# Patient Record
Sex: FEMALE | Race: WHITE | NOT HISPANIC OR LATINO
[De-identification: names, ages, dates, MRNs, and addresses within clinical notes are randomized per-mention and may not be internally consistent; named-entity substitution may affect disease eponyms.]

---

## 2017-08-17 ENCOUNTER — APPOINTMENT (OUTPATIENT)
Dept: CARDIOLOGY | Facility: CLINIC | Age: 72
End: 2017-08-17
Payer: MEDICARE

## 2017-08-17 PROCEDURE — 93306 TTE W/DOPPLER COMPLETE: CPT

## 2017-09-26 ENCOUNTER — OTHER (OUTPATIENT)
Age: 72
End: 2017-09-26

## 2017-11-22 ENCOUNTER — EMERGENCY (EMERGENCY)
Facility: HOSPITAL | Age: 72
LOS: 1 days | Discharge: HOME | End: 2017-11-22
Attending: EMERGENCY MEDICINE
Payer: COMMERCIAL

## 2017-11-22 VITALS
OXYGEN SATURATION: 99 % | TEMPERATURE: 98 F | DIASTOLIC BLOOD PRESSURE: 82 MMHG | RESPIRATION RATE: 18 BRPM | HEART RATE: 79 BPM | SYSTOLIC BLOOD PRESSURE: 172 MMHG | WEIGHT: 119.93 LBS | HEIGHT: 65 IN

## 2017-11-22 LAB
ALBUMIN SERPL ELPH-MCNC: 4.6 G/DL — SIGNIFICANT CHANGE UP (ref 3.3–5)
ALP SERPL-CCNC: 61 U/L — SIGNIFICANT CHANGE UP (ref 40–120)
ALT FLD-CCNC: 27 U/L RC — SIGNIFICANT CHANGE UP (ref 10–45)
ANION GAP SERPL CALC-SCNC: 15 MMOL/L — SIGNIFICANT CHANGE UP (ref 5–17)
AST SERPL-CCNC: 40 U/L — SIGNIFICANT CHANGE UP (ref 10–40)
BASOPHILS # BLD AUTO: 0 K/UL — SIGNIFICANT CHANGE UP (ref 0–0.2)
BASOPHILS NFR BLD AUTO: 0.3 % — SIGNIFICANT CHANGE UP (ref 0–2)
BILIRUB SERPL-MCNC: 0.3 MG/DL — SIGNIFICANT CHANGE UP (ref 0.2–1.2)
BUN SERPL-MCNC: 12 MG/DL — SIGNIFICANT CHANGE UP (ref 7–23)
CALCIUM SERPL-MCNC: 9.4 MG/DL — SIGNIFICANT CHANGE UP (ref 8.4–10.5)
CHLORIDE SERPL-SCNC: 93 MMOL/L — LOW (ref 96–108)
CO2 SERPL-SCNC: 27 MMOL/L — SIGNIFICANT CHANGE UP (ref 22–31)
CREAT SERPL-MCNC: 0.61 MG/DL — SIGNIFICANT CHANGE UP (ref 0.5–1.3)
EOSINOPHIL # BLD AUTO: 0.1 K/UL — SIGNIFICANT CHANGE UP (ref 0–0.5)
EOSINOPHIL NFR BLD AUTO: 1 % — SIGNIFICANT CHANGE UP (ref 0–6)
GLUCOSE SERPL-MCNC: 115 MG/DL — HIGH (ref 70–99)
HCT VFR BLD CALC: 40.4 % — SIGNIFICANT CHANGE UP (ref 34.5–45)
HGB BLD-MCNC: 13.8 G/DL — SIGNIFICANT CHANGE UP (ref 11.5–15.5)
LIDOCAIN IGE QN: 49 U/L — SIGNIFICANT CHANGE UP (ref 7–60)
LYMPHOCYTES # BLD AUTO: 1.4 K/UL — SIGNIFICANT CHANGE UP (ref 1–3.3)
LYMPHOCYTES # BLD AUTO: 26.9 % — SIGNIFICANT CHANGE UP (ref 13–44)
MCHC RBC-ENTMCNC: 32.8 PG — SIGNIFICANT CHANGE UP (ref 27–34)
MCHC RBC-ENTMCNC: 34 GM/DL — SIGNIFICANT CHANGE UP (ref 32–36)
MCV RBC AUTO: 96.4 FL — SIGNIFICANT CHANGE UP (ref 80–100)
MONOCYTES # BLD AUTO: 0.8 K/UL — SIGNIFICANT CHANGE UP (ref 0–0.9)
MONOCYTES NFR BLD AUTO: 16 % — HIGH (ref 2–14)
NEUTROPHILS # BLD AUTO: 2.8 K/UL — SIGNIFICANT CHANGE UP (ref 1.8–7.4)
NEUTROPHILS NFR BLD AUTO: 55.7 % — SIGNIFICANT CHANGE UP (ref 43–77)
PLATELET # BLD AUTO: 287 K/UL — SIGNIFICANT CHANGE UP (ref 150–400)
POTASSIUM SERPL-MCNC: 4 MMOL/L — SIGNIFICANT CHANGE UP (ref 3.5–5.3)
POTASSIUM SERPL-SCNC: 4 MMOL/L — SIGNIFICANT CHANGE UP (ref 3.5–5.3)
PROT SERPL-MCNC: 7.9 G/DL — SIGNIFICANT CHANGE UP (ref 6–8.3)
RBC # BLD: 4.19 M/UL — SIGNIFICANT CHANGE UP (ref 3.8–5.2)
RBC # FLD: 11.6 % — SIGNIFICANT CHANGE UP (ref 10.3–14.5)
SODIUM SERPL-SCNC: 135 MMOL/L — SIGNIFICANT CHANGE UP (ref 135–145)
WBC # BLD: 5 K/UL — SIGNIFICANT CHANGE UP (ref 3.8–10.5)
WBC # FLD AUTO: 5 K/UL — SIGNIFICANT CHANGE UP (ref 3.8–10.5)

## 2017-11-22 PROCEDURE — 99284 EMERGENCY DEPT VISIT MOD MDM: CPT | Mod: 25

## 2017-11-22 PROCEDURE — 83690 ASSAY OF LIPASE: CPT

## 2017-11-22 PROCEDURE — 85027 COMPLETE CBC AUTOMATED: CPT

## 2017-11-22 PROCEDURE — 96375 TX/PRO/DX INJ NEW DRUG ADDON: CPT

## 2017-11-22 PROCEDURE — 93010 ELECTROCARDIOGRAM REPORT: CPT

## 2017-11-22 PROCEDURE — 80053 COMPREHEN METABOLIC PANEL: CPT

## 2017-11-22 PROCEDURE — 93005 ELECTROCARDIOGRAM TRACING: CPT

## 2017-11-22 PROCEDURE — 96374 THER/PROPH/DIAG INJ IV PUSH: CPT

## 2017-11-22 RX ORDER — SODIUM CHLORIDE 9 MG/ML
1000 INJECTION INTRAMUSCULAR; INTRAVENOUS; SUBCUTANEOUS ONCE
Qty: 0 | Refills: 0 | Status: COMPLETED | OUTPATIENT
Start: 2017-11-22 | End: 2017-11-22

## 2017-11-22 RX ORDER — ONDANSETRON 8 MG/1
4 TABLET, FILM COATED ORAL ONCE
Qty: 0 | Refills: 0 | Status: COMPLETED | OUTPATIENT
Start: 2017-11-22 | End: 2017-11-22

## 2017-11-22 RX ORDER — FAMOTIDINE 10 MG/ML
20 INJECTION INTRAVENOUS ONCE
Qty: 0 | Refills: 0 | Status: COMPLETED | OUTPATIENT
Start: 2017-11-22 | End: 2017-11-22

## 2017-11-22 RX ADMIN — ONDANSETRON 4 MILLIGRAM(S): 8 TABLET, FILM COATED ORAL at 15:07

## 2017-11-22 RX ADMIN — FAMOTIDINE 20 MILLIGRAM(S): 10 INJECTION INTRAVENOUS at 15:07

## 2017-11-22 RX ADMIN — SODIUM CHLORIDE 1000 MILLILITER(S): 9 INJECTION INTRAMUSCULAR; INTRAVENOUS; SUBCUTANEOUS at 15:06

## 2017-11-22 NOTE — ED PROVIDER NOTE - PROGRESS NOTE DETAILS
Dr. Seun Foley PGY1: nausea improved s/p pepcid/nausea will po challenge Dr. Seun Foley PGY1: pt able to tolerate 1/2 cup water w/ minimal nausea still pending labs Dr. Seun Foley PGY1: labs wnl ok for dc

## 2017-11-22 NOTE — ED ADULT NURSE NOTE - OBJECTIVE STATEMENT
c/o nausea and vomiting x 3-4 days since starting po antibiotics for strep throat. At this time denies any fever, cough or sore throat, reports she had recent strep cx of throat which was negative. States she is unable to tolerate fluids or food po, vomiting about 30 minutes after eating or drinking. Also reports loose stools. Denies any abdominal pain, abdomen is nontender and nondistended. Skin warm dry and intact.

## 2017-11-22 NOTE — ED PROVIDER NOTE - MEDICAL DECISION MAKING DETAILS
73 yo F w/ nausea x 4 days no pain, recent hx strept throat took erythromycin appears dry but otherwise well 71 yo F w/ nausea x 4 days no pain, recent hx strept throat took erythromycin appears dry but otherwise well ECG is normal ,probably drug related gastritis ,will obtain blood work ,lipase to ro pancreatitis and on reevaluation: ZR

## 2017-11-22 NOTE — ED PROVIDER NOTE - OBJECTIVE STATEMENT
71 yo F w/ pmh htn, hld, hypothyroidism p/w nausea and vomit x 4 days. Pt has been unable to keep fluids down last few days. Pt was treated for strept throat (sore throat, from grandkids) w/ erythromycin on Sunday. Pt saw Dr. Dial yesterday and had cxr/labs drawn, said low Na. Pt reports pressure HA assc w/ vomiting; one episode non-b diarrhea today. Pt denies cp/abd p, fever/chills, sob, lightheadedness. Pt has been trying to take otc meds for n/v but didn't work. Last vomit 8:30am.     PCP: Dr. Roger Dial 71 yo F w/ pmh htn, hld, hypothyroidism p/w nausea and vomit x 4 days. Pt has been unable to keep fluids down last few days. Pt was treated for strept throat (sore throat, from grandkids) w/ erythromycin on Sunday. Pt saw Dr. Dial yesterday and had cxr/labs drawn, said low Na. Pt reports pressure HA assc w/ vomiting; one episode non-b diarrhea today. Pt denies cp/abd p, fever/chills, sob, lightheadedness, lethargy. Pt has been trying to take otc meds for n/v but didn't work. Last vomit 8:30am.     PCP: Dr. Roger Dial

## 2017-11-22 NOTE — ED PROVIDER NOTE - ENMT, MLM
Airway patent, Nasal mucosa clear. Mouth w/ dry mucosal membranes. Throat has no vesicles, no oropharyngeal exudates and uvula is midline.

## 2018-01-02 ENCOUNTER — APPOINTMENT (OUTPATIENT)
Dept: OBGYN | Facility: CLINIC | Age: 73
End: 2018-01-02

## 2018-07-07 ENCOUNTER — EMERGENCY (EMERGENCY)
Facility: HOSPITAL | Age: 73
LOS: 1 days | Discharge: HOME | End: 2018-07-07
Attending: EMERGENCY MEDICINE
Payer: COMMERCIAL

## 2018-07-07 VITALS
DIASTOLIC BLOOD PRESSURE: 75 MMHG | OXYGEN SATURATION: 95 % | RESPIRATION RATE: 19 BRPM | HEART RATE: 85 BPM | TEMPERATURE: 98 F | SYSTOLIC BLOOD PRESSURE: 145 MMHG

## 2018-07-07 VITALS
SYSTOLIC BLOOD PRESSURE: 157 MMHG | OXYGEN SATURATION: 97 % | RESPIRATION RATE: 18 BRPM | TEMPERATURE: 99 F | HEART RATE: 89 BPM | WEIGHT: 119.93 LBS | HEIGHT: 65 IN | DIASTOLIC BLOOD PRESSURE: 68 MMHG

## 2018-07-07 PROCEDURE — 90715 TDAP VACCINE 7 YRS/> IM: CPT

## 2018-07-07 PROCEDURE — 70486 CT MAXILLOFACIAL W/O DYE: CPT

## 2018-07-07 PROCEDURE — 99284 EMERGENCY DEPT VISIT MOD MDM: CPT | Mod: 25

## 2018-07-07 PROCEDURE — 70450 CT HEAD/BRAIN W/O DYE: CPT | Mod: 26

## 2018-07-07 PROCEDURE — 99285 EMERGENCY DEPT VISIT HI MDM: CPT | Mod: 25

## 2018-07-07 PROCEDURE — 76377 3D RENDER W/INTRP POSTPROCES: CPT | Mod: 26

## 2018-07-07 PROCEDURE — 90471 IMMUNIZATION ADMIN: CPT

## 2018-07-07 PROCEDURE — 25605 CLTX DST RDL FX/EPHYS SEP W/: CPT | Mod: LT

## 2018-07-07 PROCEDURE — 70486 CT MAXILLOFACIAL W/O DYE: CPT | Mod: 26

## 2018-07-07 PROCEDURE — 73110 X-RAY EXAM OF WRIST: CPT

## 2018-07-07 PROCEDURE — 76377 3D RENDER W/INTRP POSTPROCES: CPT

## 2018-07-07 PROCEDURE — 70450 CT HEAD/BRAIN W/O DYE: CPT

## 2018-07-07 PROCEDURE — 73110 X-RAY EXAM OF WRIST: CPT | Mod: 26,LT

## 2018-07-07 RX ORDER — ACETAMINOPHEN 500 MG
975 TABLET ORAL ONCE
Qty: 0 | Refills: 0 | Status: COMPLETED | OUTPATIENT
Start: 2018-07-07 | End: 2018-07-07

## 2018-07-07 RX ORDER — TETANUS TOXOID, REDUCED DIPHTHERIA TOXOID AND ACELLULAR PERTUSSIS VACCINE, ADSORBED 5; 2.5; 8; 8; 2.5 [IU]/.5ML; [IU]/.5ML; UG/.5ML; UG/.5ML; UG/.5ML
0.5 SUSPENSION INTRAMUSCULAR ONCE
Qty: 0 | Refills: 0 | Status: COMPLETED | OUTPATIENT
Start: 2018-07-07 | End: 2018-07-07

## 2018-07-07 RX ADMIN — Medication 975 MILLIGRAM(S): at 15:18

## 2018-07-07 RX ADMIN — TETANUS TOXOID, REDUCED DIPHTHERIA TOXOID AND ACELLULAR PERTUSSIS VACCINE, ADSORBED 0.5 MILLILITER(S): 5; 2.5; 8; 8; 2.5 SUSPENSION INTRAMUSCULAR at 15:53

## 2018-07-07 RX ADMIN — Medication 975 MILLIGRAM(S): at 16:00

## 2018-07-07 NOTE — ED PROVIDER NOTE - PLAN OF CARE
Take all medications as directed.  For pain take Ibuprofen (Motrin, Advil) 400mg-600mg every 6-8 hours or Acetaminophen (Tylenol) 250mg-650mg every 6-8 hours.  Follow up with your primary physician in 3-4 days. If needed call [phone] to find a primary care physician or call  [phone] to schedule an appointment with the general medicine clinic.   You were given copies of all labs and imaging results from this ER visit, please take them to your appointment.  Return to the ER for worsening symptoms or any other concerns.

## 2018-07-07 NOTE — ED PROVIDER NOTE - MEDICAL DECISION MAKING DETAILS
73y female with PMH of HTN, Hypothyroid, and HLD presenting with L. wrist pain, nose pain with an abrasion and ecchymosis after a mechanical fall.  Patient denies LOC and was able to immediately walk home after the fall.  No focal neurological deficits, not on blood thinners.  Get x-ray of left wrist and elbow. 73y female with PMH of HTN, Hypothyroid, and HLD presenting with L. wrist pain, nose pain with an abrasion and ecchymosis after a mechanical fall.  Patient denies LOC and was able to immediately walk home after the fall.  No focal neurological deficits, not on blood thinners.  Maxillofacial and head CT.  Get x-ray of left wrist and elbow.  Tylenol for pain.  D/C home if CT negative see mdm / attending note

## 2018-07-07 NOTE — ED PROVIDER NOTE - OBJECTIVE STATEMENT
73y female w/ PMH of HTN, hypothyroid and HLD presenting with left wrist pain, nose pain and left knee pain after a fall.  She fell while out for a walk around 11:30am, tripping over the sidewalk landing on her left wrist and hitting her face and knee.  She was able to get up and walk home immediately after the fall.  Denies LOC, chest pain, dizziness, blurry vision, headache before the fall and after the fall  Denies taking aspirin or other blood thinners.  She did not take medications for pain before coming to the hospital.  Does not remember last tetanus shot.

## 2018-07-07 NOTE — ED PROVIDER NOTE - MUSCULOSKELETAL MINIMAL EXAM
Left anterior wrist swelling and eccchymosis, decreased ROM of left wrist, normal ROM right, Neurovascular intake - no decrease in sensation of hands bilaterally, capillary refill 1sec..  Abrasion to the left knee, no swelling or tenderness.  trength in upper and lower extremities 5/5 bilaterally Left anterior wrist swelling and eccchymosis, decreased ROM of left wrist, normal ROM right, Neurovascular intake - no decrease in sensation of hands bilaterally, capillary refill 1sec..  Abrasion to the left knee, no swelling or tenderness.  Strength in upper and lower extremities 5/5 bilaterally

## 2018-07-07 NOTE — ED PROVIDER NOTE - SECONDARY DIAGNOSIS.
Other closed extra-articular fracture of distal end of left radius, initial encounter Facial abrasion, initial encounter

## 2018-07-07 NOTE — ED PROVIDER NOTE - PROGRESS NOTE DETAILS
X-Ray shows fracture of left distal radius X-Ray shows fracture of left distal radius.  Will apply splint. Attending MD Brito.  Pt signed out to me in stable condition pending CT imaging.  Pt is s/p mechanical fall.  L impacted distal radius fx. Pending CT head/max-face 2/2 L periorbital ecchymosis.  NO midline spinal TTP/stepoffs.  Once CT’s result plan to d/c.  Follow-up with ortho in 1 wk. pts repeat wrist xray with improvement, CT scan with nasal fracture, will give follow up information for both orthopedics and ENT Attending MD Brito.  Pt informed fo improved alignment of L distal radius fx, non-actionable head CT and advised of minimally depressed nasal bone fx.  Pt reexamined prior to discharge and remains nvsc intact distal to site of radius fx with splint in place.  Pt counseled to follow-up in same day ortho clinic on  monday for reassessment and poss hard casting.  Given information for follow-up with ENT/plastic surgery for reassessment of facial fx.  Counseled re: nasal precautions (sneeze through open mouth, avoid blowing nose or FB in nose, no drinking with straw).  Reassessed prior to discharge and has no evidence of septal hematoma.  No epistaxis currently. Pt and  verbalize understanding of need to follow-up with ortho for wrist and ENT or plastics for facial bone fx.  Return to ED for any acutely new/worsening sxs including acutely worsened pain of face/wrist, development of epistaxis or numbness/weakness of L hand.  Stable for discharge.

## 2018-07-07 NOTE — ED PROVIDER NOTE - ATTENDING CONTRIBUTION TO CARE
------------ATTENDING NOTE------------  LHD pt w/     ED Sign Out 1800 (J [employer]) pending CT reads, reassessments, repeat VS, likely d/c w/ close outpt fu if wnl.  - Javier Patel MD   ----------------------------------------------- ------------ATTENDING NOTE------------  LHD pt w/  c/o mechanical trip/fall walking this AM, landed on face and L wrist, no loss of consciousness or AMS, c/o mild gradual onset dull frontal headache, abrasions to face and L knee, and bruising and constant mild dull ache in L wrist worse w/ movement, ambulatory at scene, washed wounds at home, came to ED at 's request, nml neuro exam, no dental/intraoral injury, HD stable, clear CTL spine, benign stable chest and abdomen, ambulatory in ED w/o pain/distress, L wrist splinted, ED Sign Out 1800 (J [employer]) pending CT reads, reassessments, repeat VS, likely d/c w/ close outpt fu if wnl.  - Javier Patel MD   -----------------------------------------------

## 2018-07-07 NOTE — ED ADULT NURSE NOTE - OBJECTIVE STATEMENT
72 y/o F A&Ox3 presents to the ED s/p mechanical fall this morning around 1100. Pt. reports she was taking her daily walk and tripped and fell on the concrete. Denies LOC and pt. states she is not taking any blood thinners. Pt. reports she landed on bilateral hands and front of face. Pt. has bilateral aristides orbital ecchymosis and swelling to nose. Pt. has abrasion to bridge of nose and in center of forehead.  Pt. reports bilateral nostrils were bleeding after fall. Bleeding controlled on arrival to ED. Pt. has ecchymosis and swelling to L hand and wrist. Pt. is able to move all L fingers and wrist with difficulty due to pain. Pt. denies numbness/tingling sensation to fingers and has a strong peripheral pulse. Pt. also has abrasions to L knee. Pt. has pos. sensory perception and pos. strength to all extremities. Pt. reports she was able to ambulate home after fall. PERRL - 2 mm bilaterally. Denies taking any pain medications. Safety and comfort provided. 72 y/o F A&Ox3 presents to the ED s/p mechanical fall this morning around 1100. Pt. reports she was taking her daily walk and tripped and fell on the concrete. Denies LOC and pt. states she is not taking any blood thinners. Pt. reports she landed on bilateral hands and front of face. Pt. has bilateral aristides orbital ecchymosis and swelling to nose. Pt. has abrasion to bridge of nose and in center of forehead.  Pt. reports bilateral nostrils were bleeding after fall. Bleeding controlled on arrival to ED. Pt. has ecchymosis and swelling to L hand and wrist. Pt. is able to move all L fingers and wrist with difficulty due to pain. Pt. denies numbness/tingling sensation to fingers and has a strong peripheral pulse. Pt. also has abrasions to L knee. Pt. has pos. sensory perception and pos. strength to all extremities. Pt. reports she was able to ambulate home after fall. PERRL - 2 mm bilaterally. Denies taking any pain medications. Tetanus vaccine not up to date. To be administered as per MD order. Safety and comfort provided.

## 2018-07-07 NOTE — ED ADULT TRIAGE NOTE - CHIEF COMPLAINT QUOTE
s/p fall while walking outside. Pt stated tripped and fall denies any LOC. Denies any syncope. Pt remember what happened before, during and after the fall. Pt fell on her face. +abrasions on the face. +swelling left hand. Pt is not on any asprin or any blood thinner

## 2018-07-07 NOTE — ED PROVIDER NOTE - CARE PLAN
Principal Discharge DX:	Closed head injury without loss of consciousness, initial encounter  Secondary Diagnosis:	Other closed extra-articular fracture of distal end of left radius, initial encounter  Secondary Diagnosis:	Facial abrasion, initial encounter Principal Discharge DX:	Closed head injury without loss of consciousness, initial encounter  Assessment and plan of treatment:	Take all medications as directed.  For pain take Ibuprofen (Motrin, Advil) 400mg-600mg every 6-8 hours or Acetaminophen (Tylenol) 250mg-650mg every 6-8 hours.  Follow up with your primary physician in 3-4 days. If needed call [phone] to find a primary care physician or call  [phone] to schedule an appointment with the general medicine clinic.   You were given copies of all labs and imaging results from this ER visit, please take them to your appointment.  Return to the ER for worsening symptoms or any other concerns.  Secondary Diagnosis:	Other closed extra-articular fracture of distal end of left radius, initial encounter  Secondary Diagnosis:	Facial abrasion, initial encounter

## 2018-07-08 NOTE — ED PROCEDURE NOTE - PROCEDURE ADDITIONAL DETAILS
Closed Distal Impacted Left Radius Fracture (soft compartments, nvi w/ bcr and FROM distally)    - anesthesia lidocaine 1% 5 ml sterile hematoma block    - fracture reduction w/ hanging weight / counter traction    - short arm sugar tong splint (stocking, webril, plaster, ace wrap)    - improved alignment plain radiographs    - nvi w/ bcr distally, comfortable in sling    Javier Patel MD

## 2018-07-10 ENCOUNTER — APPOINTMENT (OUTPATIENT)
Dept: ORTHOPEDIC SURGERY | Facility: CLINIC | Age: 73
End: 2018-07-10
Payer: MEDICARE

## 2018-07-10 VITALS
BODY MASS INDEX: 19.99 KG/M2 | HEIGHT: 65 IN | DIASTOLIC BLOOD PRESSURE: 80 MMHG | WEIGHT: 120 LBS | SYSTOLIC BLOOD PRESSURE: 158 MMHG | HEART RATE: 88 BPM

## 2018-07-10 PROCEDURE — 99203 OFFICE O/P NEW LOW 30 MIN: CPT

## 2018-07-10 PROCEDURE — 73110 X-RAY EXAM OF WRIST: CPT | Mod: LT

## 2018-07-16 PROBLEM — I10 ESSENTIAL (PRIMARY) HYPERTENSION: Chronic | Status: ACTIVE | Noted: 2017-11-22

## 2018-07-16 PROBLEM — E03.9 HYPOTHYROIDISM, UNSPECIFIED: Chronic | Status: ACTIVE | Noted: 2017-11-22

## 2018-07-16 PROBLEM — E78.5 HYPERLIPIDEMIA, UNSPECIFIED: Chronic | Status: ACTIVE | Noted: 2017-11-22

## 2018-07-17 ENCOUNTER — FORM ENCOUNTER (OUTPATIENT)
Age: 73
End: 2018-07-17

## 2018-07-18 ENCOUNTER — OUTPATIENT (OUTPATIENT)
Dept: OUTPATIENT SERVICES | Facility: HOSPITAL | Age: 73
LOS: 1 days | End: 2018-07-18
Payer: COMMERCIAL

## 2018-07-18 ENCOUNTER — APPOINTMENT (OUTPATIENT)
Dept: MRI IMAGING | Facility: CLINIC | Age: 73
End: 2018-07-18
Payer: MEDICARE

## 2018-07-18 DIAGNOSIS — S52.532A COLLES' FRACTURE OF LEFT RADIUS, INITIAL ENCOUNTER FOR CLOSED FRACTURE: ICD-10-CM

## 2018-07-18 PROCEDURE — 73221 MRI JOINT UPR EXTREM W/O DYE: CPT | Mod: 26,LT

## 2018-07-18 PROCEDURE — 73221 MRI JOINT UPR EXTREM W/O DYE: CPT

## 2018-07-20 ENCOUNTER — RECORD ABSTRACTING (OUTPATIENT)
Age: 73
End: 2018-07-20

## 2018-07-31 ENCOUNTER — APPOINTMENT (OUTPATIENT)
Dept: ORTHOPEDIC SURGERY | Facility: CLINIC | Age: 73
End: 2018-07-31
Payer: MEDICARE

## 2018-07-31 VITALS
HEIGHT: 65 IN | HEART RATE: 83 BPM | SYSTOLIC BLOOD PRESSURE: 147 MMHG | DIASTOLIC BLOOD PRESSURE: 61 MMHG | BODY MASS INDEX: 19.99 KG/M2 | WEIGHT: 120 LBS

## 2018-07-31 PROCEDURE — 99213 OFFICE O/P EST LOW 20 MIN: CPT | Mod: 25

## 2018-07-31 PROCEDURE — 29075 APPL CST ELBW FNGR SHORT ARM: CPT | Mod: LT

## 2018-07-31 PROCEDURE — 73110 X-RAY EXAM OF WRIST: CPT | Mod: LT

## 2018-08-21 ENCOUNTER — APPOINTMENT (OUTPATIENT)
Dept: ORTHOPEDIC SURGERY | Facility: CLINIC | Age: 73
End: 2018-08-21
Payer: MEDICARE

## 2018-08-21 PROCEDURE — 99213 OFFICE O/P EST LOW 20 MIN: CPT

## 2018-08-21 PROCEDURE — 73110 X-RAY EXAM OF WRIST: CPT | Mod: LT

## 2018-08-23 ENCOUNTER — APPOINTMENT (OUTPATIENT)
Dept: PULMONOLOGY | Facility: CLINIC | Age: 73
End: 2018-08-23
Payer: MEDICARE

## 2018-08-23 VITALS
WEIGHT: 120 LBS | BODY MASS INDEX: 19.99 KG/M2 | OXYGEN SATURATION: 97 % | TEMPERATURE: 98.4 F | RESPIRATION RATE: 16 BRPM | HEIGHT: 65 IN | SYSTOLIC BLOOD PRESSURE: 150 MMHG | DIASTOLIC BLOOD PRESSURE: 68 MMHG | HEART RATE: 77 BPM

## 2018-08-23 DIAGNOSIS — B02.9 ZOSTER W/OUT COMPLICATIONS: ICD-10-CM

## 2018-08-23 DIAGNOSIS — B02.23 POSTHERPETIC POLYNEUROPATHY: ICD-10-CM

## 2018-08-23 PROCEDURE — 99213 OFFICE O/P EST LOW 20 MIN: CPT

## 2018-09-28 ENCOUNTER — NON-APPOINTMENT (OUTPATIENT)
Age: 73
End: 2018-09-28

## 2018-09-28 ENCOUNTER — APPOINTMENT (OUTPATIENT)
Dept: PULMONOLOGY | Facility: CLINIC | Age: 73
End: 2018-09-28
Payer: MEDICARE

## 2018-09-28 VITALS
HEART RATE: 89 BPM | OXYGEN SATURATION: 97 % | WEIGHT: 120 LBS | BODY MASS INDEX: 19.99 KG/M2 | HEIGHT: 65 IN | DIASTOLIC BLOOD PRESSURE: 75 MMHG | SYSTOLIC BLOOD PRESSURE: 133 MMHG

## 2018-09-28 LAB
ALBUMIN: 10
BILIRUB UR QL STRIP: NEGATIVE
CLARITY UR: CLEAR
COLLECTION METHOD: NORMAL
CREATININE: 50
GLUCOSE UR-MCNC: NEGATIVE
HCG UR QL: 0.2 EU/DL
HGB UR QL STRIP.AUTO: NORMAL
KETONES UR-MCNC: NEGATIVE
LEUKOCYTE ESTERASE UR QL STRIP: NEGATIVE
MICROALBUMIN/CREAT UR TEST STR-RTO: 30
NITRITE UR QL STRIP: NORMAL
PH UR STRIP: 7
PROT UR STRIP-MCNC: NEGATIVE
SP GR UR STRIP: 1.01

## 2018-09-28 PROCEDURE — 71046 X-RAY EXAM CHEST 2 VIEWS: CPT

## 2018-09-28 PROCEDURE — 36415 COLL VENOUS BLD VENIPUNCTURE: CPT

## 2018-09-28 PROCEDURE — 82044 UR ALBUMIN SEMIQUANTITATIVE: CPT | Mod: QW

## 2018-09-28 PROCEDURE — G0008: CPT

## 2018-09-28 PROCEDURE — 99397 PER PM REEVAL EST PAT 65+ YR: CPT | Mod: 25

## 2018-09-28 PROCEDURE — 81003 URINALYSIS AUTO W/O SCOPE: CPT | Mod: QW

## 2018-09-28 PROCEDURE — 82570 ASSAY OF URINE CREATININE: CPT | Mod: QW

## 2018-09-28 PROCEDURE — 90662 IIV NO PRSV INCREASED AG IM: CPT

## 2018-09-28 PROCEDURE — 93000 ELECTROCARDIOGRAM COMPLETE: CPT

## 2018-09-28 RX ORDER — LISINOPRIL 10 MG/1
10 TABLET ORAL
Refills: 0 | Status: DISCONTINUED | COMMUNITY
End: 2018-09-28

## 2018-09-28 RX ORDER — TRIAMCINOLONE ACETONIDE 1 MG/G
0.1 OINTMENT TOPICAL
Qty: 15 | Refills: 0 | Status: DISCONTINUED | COMMUNITY
Start: 2018-08-06 | End: 2018-09-28

## 2018-09-28 RX ORDER — PREDNISONE 10 MG/1
10 TABLET ORAL
Qty: 30 | Refills: 1 | Status: DISCONTINUED | COMMUNITY
Start: 2018-08-23 | End: 2018-09-28

## 2018-09-28 RX ORDER — VALACYCLOVIR 1 G/1
1 TABLET, FILM COATED ORAL
Qty: 30 | Refills: 0 | Status: DISCONTINUED | COMMUNITY
Start: 2018-08-06 | End: 2018-09-28

## 2018-09-28 RX ORDER — LIDOCAINE 5 G/100G
5 OINTMENT TOPICAL
Qty: 50 | Refills: 0 | Status: DISCONTINUED | COMMUNITY
Start: 2018-08-08 | End: 2018-09-28

## 2018-09-29 LAB
25(OH)D3 SERPL-MCNC: 29.3 NG/ML
ALBUMIN SERPL ELPH-MCNC: 4.7 G/DL
ALP BLD-CCNC: 60 U/L
ALT SERPL-CCNC: 29 U/L
ANION GAP SERPL CALC-SCNC: 12 MMOL/L
AST SERPL-CCNC: 33 U/L
BASOPHILS # BLD AUTO: 0.02 K/UL
BASOPHILS NFR BLD AUTO: 0.3 %
BILIRUB SERPL-MCNC: 0.3 MG/DL
BUN SERPL-MCNC: 12 MG/DL
CALCIUM SERPL-MCNC: 9.5 MG/DL
CHLORIDE SERPL-SCNC: 98 MMOL/L
CHOLEST SERPL-MCNC: 240 MG/DL
CHOLEST/HDLC SERPL: 2.6 RATIO
CO2 SERPL-SCNC: 26 MMOL/L
CREAT SERPL-MCNC: 0.68 MG/DL
EOSINOPHIL # BLD AUTO: 0.18 K/UL
EOSINOPHIL NFR BLD AUTO: 2.8 %
GLUCOSE SERPL-MCNC: 99 MG/DL
HBA1C MFR BLD HPLC: 6.2 %
HCT VFR BLD CALC: 40.3 %
HCV AB SER QL: NONREACTIVE
HCV S/CO RATIO: 0.11 S/CO
HDLC SERPL-MCNC: 93 MG/DL
HGB BLD-MCNC: 12.7 G/DL
IMM GRANULOCYTES NFR BLD AUTO: 0.2 %
LDLC SERPL CALC-MCNC: 127 MG/DL
LYMPHOCYTES # BLD AUTO: 1.61 K/UL
LYMPHOCYTES NFR BLD AUTO: 25.3 %
MAN DIFF?: NORMAL
MCHC RBC-ENTMCNC: 31.1 PG
MCHC RBC-ENTMCNC: 31.5 GM/DL
MCV RBC AUTO: 98.5 FL
MONOCYTES # BLD AUTO: 0.56 K/UL
MONOCYTES NFR BLD AUTO: 8.8 %
NEUTROPHILS # BLD AUTO: 3.98 K/UL
NEUTROPHILS NFR BLD AUTO: 62.6 %
PLATELET # BLD AUTO: 363 K/UL
POTASSIUM SERPL-SCNC: 4.9 MMOL/L
PROT SERPL-MCNC: 6.9 G/DL
RBC # BLD: 4.09 M/UL
RBC # FLD: 14.4 %
SODIUM SERPL-SCNC: 136 MMOL/L
T4 SERPL-MCNC: 6.4 UG/DL
TRIGL SERPL-MCNC: 98 MG/DL
TSH SERPL-ACNC: 2.53 UIU/ML
WBC # FLD AUTO: 6.36 K/UL

## 2018-10-03 ENCOUNTER — APPOINTMENT (OUTPATIENT)
Dept: ORTHOPEDIC SURGERY | Facility: CLINIC | Age: 73
End: 2018-10-03
Payer: MEDICARE

## 2018-10-03 DIAGNOSIS — S52.532A COLLES' FRACTURE OF LEFT RADIUS, INITIAL ENCOUNTER FOR CLOSED FRACTURE: ICD-10-CM

## 2018-10-03 PROCEDURE — 99213 OFFICE O/P EST LOW 20 MIN: CPT

## 2018-10-03 PROCEDURE — 73110 X-RAY EXAM OF WRIST: CPT | Mod: LT

## 2018-10-04 PROBLEM — S52.532A CLOSED COLLES' FRACTURE OF LEFT RADIUS, INITIAL ENCOUNTER: Status: ACTIVE | Noted: 2018-07-10

## 2018-10-12 ENCOUNTER — RX RENEWAL (OUTPATIENT)
Age: 73
End: 2018-10-12

## 2018-10-14 ENCOUNTER — RX RENEWAL (OUTPATIENT)
Age: 73
End: 2018-10-14

## 2018-10-17 ENCOUNTER — APPOINTMENT (OUTPATIENT)
Dept: OBGYN | Facility: CLINIC | Age: 73
End: 2018-10-17
Payer: MEDICARE

## 2018-10-17 VITALS
HEART RATE: 93 BPM | SYSTOLIC BLOOD PRESSURE: 141 MMHG | DIASTOLIC BLOOD PRESSURE: 78 MMHG | WEIGHT: 124 LBS | HEIGHT: 65 IN | BODY MASS INDEX: 20.66 KG/M2

## 2018-10-17 PROCEDURE — G0101: CPT

## 2018-10-29 ENCOUNTER — EMERGENCY (EMERGENCY)
Facility: HOSPITAL | Age: 73
LOS: 1 days | Discharge: HOME | End: 2018-10-29
Attending: EMERGENCY MEDICINE
Payer: COMMERCIAL

## 2018-10-29 VITALS
DIASTOLIC BLOOD PRESSURE: 93 MMHG | HEIGHT: 65 IN | SYSTOLIC BLOOD PRESSURE: 174 MMHG | OXYGEN SATURATION: 99 % | HEART RATE: 90 BPM | WEIGHT: 119.93 LBS | RESPIRATION RATE: 20 BRPM | TEMPERATURE: 98 F

## 2018-10-29 PROCEDURE — 99283 EMERGENCY DEPT VISIT LOW MDM: CPT

## 2018-10-29 PROCEDURE — 99284 EMERGENCY DEPT VISIT MOD MDM: CPT

## 2018-10-29 PROCEDURE — 73590 X-RAY EXAM OF LOWER LEG: CPT

## 2018-10-29 PROCEDURE — 73590 X-RAY EXAM OF LOWER LEG: CPT | Mod: 26,RT

## 2018-10-29 RX ADMIN — Medication 1 TABLET(S): at 13:30

## 2018-10-29 NOTE — ED PROVIDER NOTE - PHYSICAL EXAMINATION
Attending MD Bishop: A & O x 3, NAD, extremities with no edema; affect appropriate. neuro exam non focal with no motor or sensory deficits.     right lower extremity: ~2cm V shaped full thickness laceration to right lateral mid shin, exposed fat. Distally NV intact RLE, no bony ttp RLE

## 2018-10-29 NOTE — ED ADULT NURSE NOTE - OBJECTIVE STATEMENT
73y Female with pmhx HTN, hypothyroidism, HLD presents yo the ED from home c/o laceration on right lower extremity. Pt. states that she was cleaning in her garage when she got cut by a metal sandra sticking out of a box. laceration is located to right lateral leg of shin. no swelling or drainage noted.  she states she Went to Urgent Care initially but was referred to ED. States her tetanus was updated in July. Denies fever chills cp sob n/v/d numbness/tingling/weakness. MD at the bedside.

## 2018-10-29 NOTE — ED PROVIDER NOTE - ATTENDING CONTRIBUTION TO CARE
Attending MD Bishop:  I personally have seen and examined this patient.  Resident note reviewed and agree on plan of care and except where noted.  See HPI, PE, and MDM for details.       73F with laceration to right lateral shin sustained on piece of metal. Wound is puncture wound, high risk of infection with primary closure thus will allow to heal by secondary intention. Prophylactic antibiotics prescribed, will need close supervision of wound healing with PMD

## 2018-10-29 NOTE — ED PROVIDER NOTE - MEDICAL DECISION MAKING DETAILS
74yo F pmhx HTN HLD hypothyroidism p/w CC RLE puncture wound. Will xray to eval for foreign body, cleanse the wound and reassess.

## 2018-10-29 NOTE — ED PROVIDER NOTE - OBJECTIVE STATEMENT
74yo F pmhx HTN, hypothyroidism, HLD p/w CC laceration on right lower extremity. Pt. states that she was cleaning in her garage when she got cut by a metal sandra sticking out of a box. Went to Urgent Care initially but was referred to ED. States her tetanus was updated in July. Denies fever chills cp sob n/v/d numbness/tingling/weakness.

## 2018-10-29 NOTE — ED PROVIDER NOTE - CARE PLAN
Principal Discharge DX:	Laceration of leg  Assessment and plan of treatment:	Please complete the entire course of antibiotics to help prevent infection.     Keep the wound dry for 24 hours, then after this you may let water run over the wound but do not scrub it. Observe for signs of infection including spreading redness around the wound, fevers (temperature over 101F), or discharge of pus from the area. Return to the emergency department if these occur     PLease use the non sticky dressing given to you to cover the wound. Keep the wound covered when not showering until a scab has formed.     Please follow up with your regular doctor in 3-5 days to re-examine the wound.

## 2018-10-29 NOTE — ED PROVIDER NOTE - PLAN OF CARE
Please complete the entire course of antibiotics to help prevent infection.     Keep the wound dry for 24 hours, then after this you may let water run over the wound but do not scrub it. Observe for signs of infection including spreading redness around the wound, fevers (temperature over 101F), or discharge of pus from the area. Return to the emergency department if these occur     PLease use the non sticky dressing given to you to cover the wound. Keep the wound covered when not showering until a scab has formed.     Please follow up with your regular doctor in 3-5 days to re-examine the wound.

## 2018-10-29 NOTE — ED ADULT NURSE NOTE - NSIMPLEMENTINTERV_GEN_ALL_ED
Implemented All Universal Safety Interventions:  [place] to call system. Call bell, personal items and telephone within reach. Instruct patient to call for assistance. Room bathroom lighting operational. Non-slip footwear when patient is off stretcher. Physically safe environment: no spills, clutter or unnecessary equipment. Stretcher in lowest position, wheels locked, appropriate side rails in place.

## 2018-11-13 ENCOUNTER — RX RENEWAL (OUTPATIENT)
Age: 73
End: 2018-11-13

## 2018-11-29 ENCOUNTER — APPOINTMENT (OUTPATIENT)
Dept: PULMONOLOGY | Facility: CLINIC | Age: 73
End: 2018-11-29
Payer: MEDICARE

## 2018-11-29 VITALS
DIASTOLIC BLOOD PRESSURE: 76 MMHG | HEART RATE: 90 BPM | OXYGEN SATURATION: 98 % | SYSTOLIC BLOOD PRESSURE: 173 MMHG | TEMPERATURE: 97.7 F

## 2018-11-29 PROCEDURE — 99214 OFFICE O/P EST MOD 30 MIN: CPT

## 2019-01-07 ENCOUNTER — APPOINTMENT (OUTPATIENT)
Dept: PULMONOLOGY | Facility: CLINIC | Age: 74
End: 2019-01-07

## 2019-01-11 ENCOUNTER — APPOINTMENT (OUTPATIENT)
Dept: PULMONOLOGY | Facility: CLINIC | Age: 74
End: 2019-01-11
Payer: MEDICARE

## 2019-01-11 VITALS
RESPIRATION RATE: 14 BRPM | HEART RATE: 93 BPM | SYSTOLIC BLOOD PRESSURE: 129 MMHG | OXYGEN SATURATION: 95 % | TEMPERATURE: 98.3 F | DIASTOLIC BLOOD PRESSURE: 77 MMHG

## 2019-01-11 LAB
GLUCOSE BLDC GLUCOMTR-MCNC: 97
HBA1C MFR BLD HPLC: 6.2

## 2019-01-11 PROCEDURE — 83036 HEMOGLOBIN GLYCOSYLATED A1C: CPT | Mod: QW

## 2019-01-11 PROCEDURE — 99213 OFFICE O/P EST LOW 20 MIN: CPT | Mod: 25

## 2019-01-11 PROCEDURE — 82962 GLUCOSE BLOOD TEST: CPT

## 2019-01-11 PROCEDURE — 36415 COLL VENOUS BLD VENIPUNCTURE: CPT

## 2019-01-11 RX ORDER — PREGABALIN 50 MG/1
50 CAPSULE ORAL 3 TIMES DAILY
Qty: 30 | Refills: 3 | Status: DISCONTINUED | COMMUNITY
Start: 2018-08-23 | End: 2019-01-11

## 2019-01-11 RX ORDER — LOSARTAN POTASSIUM 50 MG/1
50 TABLET, FILM COATED ORAL
Qty: 3 | Refills: 1 | Status: DISCONTINUED | COMMUNITY
End: 2019-01-11

## 2019-01-12 LAB
ALBUMIN SERPL ELPH-MCNC: 4.5 G/DL
ALP BLD-CCNC: 73 U/L
ALT SERPL-CCNC: 17 U/L
AST SERPL-CCNC: 24 U/L
BILIRUB DIRECT SERPL-MCNC: 0.1 MG/DL
BILIRUB INDIRECT SERPL-MCNC: 0.1 MG/DL
BILIRUB SERPL-MCNC: 0.2 MG/DL
CHOLEST SERPL-MCNC: 206 MG/DL
CHOLEST/HDLC SERPL: 2.6 RATIO
HDLC SERPL-MCNC: 79 MG/DL
LDLC SERPL CALC-MCNC: 105 MG/DL
PROT SERPL-MCNC: 7.1 G/DL
T4 FREE SERPL-MCNC: 1.1 NG/DL
T4 SERPL-MCNC: 6.8 UG/DL
TRIGL SERPL-MCNC: 111 MG/DL
TSH SERPL-ACNC: 1.36 UIU/ML

## 2019-01-12 NOTE — DISCUSSION/SUMMARY
[FreeTextEntry1] : HLD\par Hypothyroidism\par Hypercholesterolemia\par Glucose intolerance\par Mild diastolic dysfunction\par Chronic osteoarthritis\par \par Medications reviewed\par Low sugar diet\par Avoid carbohydrates\par Follow-up 3 months\par \par Blood work pending including thyroid function testing and lipid profile\par Follow-up status of mammogram\par \par January 12, 2019 laboratory review review patient called\par Thyroid function testing normal range\par Cholesterol 206 HDL 79  we will continue current dosing of statin\par Liver function testing normal\par

## 2019-01-12 NOTE — PROCEDURE
[FreeTextEntry1] : blood draw\par \par \par Chest x-ray PA and lateral projection Sept 2018\par Cardiac size grossly normal\par Lung fields are grossly clear\par Limitation right hemidiaphragm\par No parenchymal infiltrates\par No dominant or nodules\par No pleural effusion\par No pneumothorax\par Mediastinum and hilum is grossly normal comparison to November 21, 2017 no interval change\par \par EKG NSR GRACIELA Sept 28 2018\par \par Glucose 97 mg/dl\par HGBA1C 6.2\par \par

## 2019-01-12 NOTE — REVIEW OF SYSTEMS
[Negative] : Psychiatric [Headache] : no headache [Dizziness] : no dizziness [Focal Weakness] : no focal weakness [Numbness] : no numbness [Paralysis] : no paralysis was seen

## 2019-01-12 NOTE — PHYSICAL EXAM
[General Appearance - Well Developed] : well developed [Normal Appearance] : normal appearance [Well Groomed] : well groomed [General Appearance - Well Nourished] : well nourished [No Deformities] : no deformities [General Appearance - In No Acute Distress] : no acute distress [Normal Conjunctiva] : the conjunctiva exhibited no abnormalities [Eyelids - No Xanthelasma] : the eyelids demonstrated no xanthelasmas [Normal Oropharynx] : normal oropharynx [Neck Appearance] : the appearance of the neck was normal [Neck Cervical Mass (___cm)] : no neck mass was observed [Jugular Venous Distention Increased] : there was no jugular-venous distention [Thyroid Diffuse Enlargement] : the thyroid was not enlarged [Heart Rate And Rhythm] : heart rate and rhythm were normal [Heart Sounds] : normal S1 and S2 [Murmurs] : no murmurs present [Respiration, Rhythm And Depth] : normal respiratory rhythm and effort [Exaggerated Use Of Accessory Muscles For Inspiration] : no accessory muscle use [Auscultation Breath Sounds / Voice Sounds] : lungs were clear to auscultation bilaterally [Chest Palpation] : palpation of the chest revealed no abnormalities [Lungs Percussion] : the lungs were normal to percussion [Abdomen Soft] : soft [Abdomen Tenderness] : non-tender [Abdomen Mass (___ Cm)] : no abdominal mass palpated [Abnormal Walk] : normal gait [Nail Clubbing] : no clubbing of the fingernails [Cyanosis, Localized] : no localized cyanosis [Petechial Hemorrhages (___cm)] : no petechial hemorrhages [] : no ischemic changes [Deep Tendon Reflexes (DTR)] : deep tendon reflexes were 2+ and symmetric [Sensation] : the sensory exam was normal to light touch and pinprick [No Focal Deficits] : no focal deficits [Oriented To Time, Place, And Person] : oriented to person, place, and time [Impaired Insight] : insight and judgment were intact [Affect] : the affect was normal [FreeTextEntry1] : healed shinges over right buttock

## 2019-01-12 NOTE — HISTORY OF PRESENT ILLNESS
[Stable] : are stable [Difficulty Breathing During Exertion] : denies dyspnea on exertion [Feelings Of Weakness On Exertion] : denies exercise intolerance [Cough] : denies coughing [Wheezing] : denies wheezing [Regional Soft Tissue Swelling Both Lower Extremities] : denies lower extremity edema [Chest Pain Or Discomfort] : denies chest pain [Fever] : denies fever [FreeTextEntry1] : s/p 1 month ago metal wound at lfet lower extremity\par 1 month txed at [facility] Did get Tetanus shot at that time\par  1 day ago seen Urgent care and txed with Bactrim for presumed  and Now resolved

## 2019-01-16 ENCOUNTER — RX RENEWAL (OUTPATIENT)
Age: 74
End: 2019-01-16

## 2019-04-10 ENCOUNTER — APPOINTMENT (OUTPATIENT)
Dept: PULMONOLOGY | Facility: CLINIC | Age: 74
End: 2019-04-10
Payer: MEDICARE

## 2019-04-10 VITALS
BODY MASS INDEX: 20.83 KG/M2 | HEART RATE: 83 BPM | TEMPERATURE: 98.9 F | DIASTOLIC BLOOD PRESSURE: 76 MMHG | RESPIRATION RATE: 16 BRPM | WEIGHT: 125 LBS | SYSTOLIC BLOOD PRESSURE: 129 MMHG | OXYGEN SATURATION: 96 % | HEIGHT: 65 IN

## 2019-04-10 LAB
GLUCOSE BLDC GLUCOMTR-MCNC: 91
HBA1C MFR BLD HPLC: 6.2

## 2019-04-10 PROCEDURE — 83036 HEMOGLOBIN GLYCOSYLATED A1C: CPT | Mod: QW

## 2019-04-10 PROCEDURE — 99213 OFFICE O/P EST LOW 20 MIN: CPT | Mod: 25

## 2019-04-10 PROCEDURE — 36415 COLL VENOUS BLD VENIPUNCTURE: CPT

## 2019-04-10 PROCEDURE — 82962 GLUCOSE BLOOD TEST: CPT

## 2019-04-10 NOTE — PHYSICAL EXAM
[General Appearance - Well Developed] : well developed [Normal Appearance] : normal appearance [Well Groomed] : well groomed [General Appearance - Well Nourished] : well nourished [General Appearance - In No Acute Distress] : no acute distress [No Deformities] : no deformities [Normal Conjunctiva] : the conjunctiva exhibited no abnormalities [Eyelids - No Xanthelasma] : the eyelids demonstrated no xanthelasmas [Normal Oropharynx] : normal oropharynx [Neck Appearance] : the appearance of the neck was normal [Neck Cervical Mass (___cm)] : no neck mass was observed [Jugular Venous Distention Increased] : there was no jugular-venous distention [Thyroid Diffuse Enlargement] : the thyroid was not enlarged [Heart Sounds] : normal S1 and S2 [Heart Rate And Rhythm] : heart rate and rhythm were normal [Murmurs] : no murmurs present [Respiration, Rhythm And Depth] : normal respiratory rhythm and effort [Exaggerated Use Of Accessory Muscles For Inspiration] : no accessory muscle use [Auscultation Breath Sounds / Voice Sounds] : lungs were clear to auscultation bilaterally [Chest Palpation] : palpation of the chest revealed no abnormalities [Lungs Percussion] : the lungs were normal to percussion [Abdomen Soft] : soft [Abdomen Tenderness] : non-tender [Abdomen Mass (___ Cm)] : no abdominal mass palpated [Abnormal Walk] : normal gait [Nail Clubbing] : no clubbing of the fingernails [Petechial Hemorrhages (___cm)] : no petechial hemorrhages [Cyanosis, Localized] : no localized cyanosis [] : no ischemic changes [Deep Tendon Reflexes (DTR)] : deep tendon reflexes were 2+ and symmetric [Sensation] : the sensory exam was normal to light touch and pinprick [No Focal Deficits] : no focal deficits [Oriented To Time, Place, And Person] : oriented to person, place, and time [Impaired Insight] : insight and judgment were intact [Affect] : the affect was normal [FreeTextEntry1] : healed shinges over right buttock

## 2019-04-10 NOTE — DISCUSSION/SUMMARY
[FreeTextEntry1] : HLD\par Hypothyroidism\par Hypercholesterolemia\par Glucose intolerance\par Mild diastolic dysfunction\par Chronic osteoarthritis\par \par Medications reviewed\par Low sugar diet\par Avoid carbohydrates\par Follow-up 3 months and redo LIPIDS\par \par Follow-up status of mammogram Completed BiRads 1\par \par \par January 12, 2019 laboratory review review patient called\par Thyroid function testing normal range\par Cholesterol 206 HDL 79  we will continue current dosing of statin\par Liver function testing normal\par

## 2019-04-10 NOTE — PROCEDURE
[FreeTextEntry1] : \par HGBA1C 6.2\par  Glucose  91\par  Blood drawblood draw\par \par \par Chest x-ray PA and lateral projection Sept 2018\par Cardiac size grossly normal\par Lung fields are grossly clear\par Limitation right hemidiaphragm\par No parenchymal infiltrates\par No dominant or nodules\par No pleural effusion\par No pneumothorax\par Mediastinum and hilum is grossly normal comparison to November 21, 2017 no interval change\par \par EKG NSR GRACIELA Sept 28 2018\par \par Glucose 97 mg/dl\par HGBA1C 6.2\par \par

## 2019-04-10 NOTE — HISTORY OF PRESENT ILLNESS
[Stable] : are stable [Difficulty Breathing During Exertion] : denies dyspnea on exertion [Feelings Of Weakness On Exertion] : denies exercise intolerance [Cough] : denies coughing [Wheezing] : denies wheezing [Regional Soft Tissue Swelling Both Lower Extremities] : denies lower extremity edema [Chest Pain Or Discomfort] : denies chest pain [Fever] : denies fever [FreeTextEntry1] : no active complaints\par

## 2019-04-10 NOTE — REVIEW OF SYSTEMS
[Negative] : Psychiatric [Dizziness] : no dizziness [Headache] : no headache [Focal Weakness] : no focal weakness [Paralysis] : no paralysis was seen [Numbness] : no numbness

## 2019-06-13 ENCOUNTER — RX RENEWAL (OUTPATIENT)
Age: 74
End: 2019-06-13

## 2019-07-10 ENCOUNTER — APPOINTMENT (OUTPATIENT)
Dept: PULMONOLOGY | Facility: CLINIC | Age: 74
End: 2019-07-10
Payer: MEDICARE

## 2019-07-10 VITALS — DIASTOLIC BLOOD PRESSURE: 72 MMHG | OXYGEN SATURATION: 98 % | HEART RATE: 86 BPM | SYSTOLIC BLOOD PRESSURE: 128 MMHG

## 2019-07-10 LAB
ALBUMIN SERPL ELPH-MCNC: 4.5 G/DL
ALP BLD-CCNC: 63 U/L
ALT SERPL-CCNC: 21 U/L
AST SERPL-CCNC: 25 U/L
BILIRUB DIRECT SERPL-MCNC: 0.1 MG/DL
BILIRUB INDIRECT SERPL-MCNC: 0.1 MG/DL
BILIRUB SERPL-MCNC: 0.2 MG/DL
CHOLEST SERPL-MCNC: 190 MG/DL
CHOLEST/HDLC SERPL: 2.8 RATIO
GLUCOSE BLDC GLUCOMTR-MCNC: 127
HBA1C MFR BLD HPLC: 6.2
HDLC SERPL-MCNC: 69 MG/DL
LDLC SERPL CALC-MCNC: 89 MG/DL
PROT SERPL-MCNC: 6.7 G/DL
TRIGL SERPL-MCNC: 161 MG/DL

## 2019-07-10 PROCEDURE — 99213 OFFICE O/P EST LOW 20 MIN: CPT | Mod: 25

## 2019-07-10 PROCEDURE — 36415 COLL VENOUS BLD VENIPUNCTURE: CPT

## 2019-07-10 PROCEDURE — 82962 GLUCOSE BLOOD TEST: CPT

## 2019-07-10 PROCEDURE — 83036 HEMOGLOBIN GLYCOSYLATED A1C: CPT | Mod: QW

## 2019-07-10 NOTE — PROCEDURE
[FreeTextEntry1] : Blood draw-lipid profile\par Hemoglobin A1c 6.2 %  7/10/19\par Glucose fingerstick  127\par \par Chest x-ray PA and lateral projection Sept 2018\par Cardiac size grossly normal\par Lung fields are grossly clear\par Limitation right hemidiaphragm\par No parenchymal infiltrates\par No dominant or nodules\par No pleural effusion\par No pneumothorax\par Mediastinum and hilum is grossly normal comparison to November 21, 2017 no interval change\par \par EKG NSR GRACIELA Sept 28 2018\par \par Data review\par Lipid profile\par Cholesterol 190\par HDL 69 LDL 89\par Triglyceride 161\par Liver function testing normal range\par \par

## 2019-07-10 NOTE — PHYSICAL EXAM
[General Appearance - Well Developed] : well developed [Normal Appearance] : normal appearance [Well Groomed] : well groomed [General Appearance - Well Nourished] : well nourished [No Deformities] : no deformities [Normal Conjunctiva] : the conjunctiva exhibited no abnormalities [General Appearance - In No Acute Distress] : no acute distress [Normal Oropharynx] : normal oropharynx [Eyelids - No Xanthelasma] : the eyelids demonstrated no xanthelasmas [Neck Cervical Mass (___cm)] : no neck mass was observed [Neck Appearance] : the appearance of the neck was normal [Jugular Venous Distention Increased] : there was no jugular-venous distention [Thyroid Diffuse Enlargement] : the thyroid was not enlarged [Heart Rate And Rhythm] : heart rate and rhythm were normal [Heart Sounds] : normal S1 and S2 [Murmurs] : no murmurs present [Respiration, Rhythm And Depth] : normal respiratory rhythm and effort [Exaggerated Use Of Accessory Muscles For Inspiration] : no accessory muscle use [Chest Palpation] : palpation of the chest revealed no abnormalities [Lungs Percussion] : the lungs were normal to percussion [Auscultation Breath Sounds / Voice Sounds] : lungs were clear to auscultation bilaterally [Abdomen Soft] : soft [Abdomen Tenderness] : non-tender [Abdomen Mass (___ Cm)] : no abdominal mass palpated [Abnormal Walk] : normal gait [Petechial Hemorrhages (___cm)] : no petechial hemorrhages [Nail Clubbing] : no clubbing of the fingernails [Cyanosis, Localized] : no localized cyanosis [] : no ischemic changes [Sensation] : the sensory exam was normal to light touch and pinprick [Deep Tendon Reflexes (DTR)] : deep tendon reflexes were 2+ and symmetric [No Focal Deficits] : no focal deficits [Oriented To Time, Place, And Person] : oriented to person, place, and time [Impaired Insight] : insight and judgment were intact [Affect] : the affect was normal [FreeTextEntry1] : healed shinges over right buttock

## 2019-07-10 NOTE — DISCUSSION/SUMMARY
[FreeTextEntry1] : HLD\par Hypothyroidism\par Hypercholesterolemia\par Glucose intolerance\par Mild diastolic dysfunction\par Chronic osteoarthritis\par Osteoporosis screening and was told patient had a scan completed February but called [facility] which she states was completed they have no record of it\par At follow-up visit we will add bone density\par \par Medications reviewed\par Low sugar diet\par Avoid carbohydrates\par Follow-up 3 months well visit and flu vaccination encounter\par \par Follow-up status of mammogram Completed BiRads 1\par \par January 12, 2019 laboratory review review patient called\par Thyroid function testing normal range\par Cholesterol 206 HDL 79  we will continue current dosing of statin\par Liver function testing normal\par

## 2019-07-10 NOTE — REVIEW OF SYSTEMS
[Hypertension] : ~T hypertension [Negative] : Musculoskeletal [Headache] : no headache [Dizziness] : no dizziness [Paralysis] : no paralysis was seen [Numbness] : no numbness [Focal Weakness] : no focal weakness [FreeTextEntry9] : IAN [de-identified] : Hypothyroidism, glucose intolerance

## 2019-07-10 NOTE — REASON FOR VISIT
[Follow-Up] : a follow-up visit [FreeTextEntry2] : Retention, hyperlipidemia, glucose intolerance, review osteoporosis screening

## 2019-07-10 NOTE — HISTORY OF PRESENT ILLNESS
[Difficulty Breathing During Exertion] : denies dyspnea on exertion [Feelings Of Weakness On Exertion] : denies exercise intolerance [Cough] : denies coughing [Wheezing] : denies wheezing [Regional Soft Tissue Swelling Both Lower Extremities] : denies lower extremity edema [Fever] : denies fever [Chest Pain Or Discomfort] : denies chest pain [Former] : is a former smoker [FreeTextEntry1] : left jaw pain\par feels click and harder to open mouth\par  also feels lump in left ear \par  states had placed plug in left ear several nights ago\par  s/p DDS \par Root canal infection and placed on Amoxicillin\par Weight stable\par Appetite normal\par Denies any chronic fatigue\par No respiratory symptomatology\par No chest pain exertional chest pain\par No lower extremity edema\par No active urinary symptoms frequency

## 2019-08-06 ENCOUNTER — RX RENEWAL (OUTPATIENT)
Age: 74
End: 2019-08-06

## 2019-08-16 ENCOUNTER — RX RENEWAL (OUTPATIENT)
Age: 74
End: 2019-08-16

## 2019-08-23 ENCOUNTER — APPOINTMENT (OUTPATIENT)
Dept: PULMONOLOGY | Facility: CLINIC | Age: 74
End: 2019-08-23
Payer: MEDICARE

## 2019-08-23 VITALS
HEART RATE: 86 BPM | SYSTOLIC BLOOD PRESSURE: 148 MMHG | OXYGEN SATURATION: 98 % | TEMPERATURE: 99.5 F | DIASTOLIC BLOOD PRESSURE: 76 MMHG

## 2019-08-23 LAB
BILIRUB UR QL STRIP: NORMAL
CLARITY UR: CLEAR
COLLECTION METHOD: NORMAL
GLUCOSE BLDC GLUCOMTR-MCNC: 104
GLUCOSE UR-MCNC: NORMAL
HCG UR QL: 0.2 EU/DL
HGB UR QL STRIP.AUTO: NORMAL
KETONES UR-MCNC: NORMAL
LEUKOCYTE ESTERASE UR QL STRIP: NORMAL
NITRITE UR QL STRIP: NORMAL
PH UR STRIP: 5
PROT UR STRIP-MCNC: NORMAL
SP GR UR STRIP: 1.02

## 2019-08-23 PROCEDURE — 82962 GLUCOSE BLOOD TEST: CPT

## 2019-08-23 PROCEDURE — 81003 URINALYSIS AUTO W/O SCOPE: CPT | Mod: QW

## 2019-08-23 PROCEDURE — 99213 OFFICE O/P EST LOW 20 MIN: CPT | Mod: 25

## 2019-08-23 PROCEDURE — 36415 COLL VENOUS BLD VENIPUNCTURE: CPT

## 2019-08-23 RX ORDER — AMOXICILLIN 500 MG/1
500 TABLET, FILM COATED ORAL
Qty: 28 | Refills: 0 | Status: DISCONTINUED | COMMUNITY
Start: 2019-07-09 | End: 2019-08-23

## 2019-08-26 LAB
ALBUMIN SERPL ELPH-MCNC: 4.3 G/DL
ALP BLD-CCNC: 60 U/L
ALT SERPL-CCNC: 20 U/L
AST SERPL-CCNC: 27 U/L
BILIRUB DIRECT SERPL-MCNC: 0.1 MG/DL
BILIRUB INDIRECT SERPL-MCNC: 0.2 MG/DL
BILIRUB SERPL-MCNC: 0.3 MG/DL
CHOLEST SERPL-MCNC: 205 MG/DL
CHOLEST/HDLC SERPL: 3 RATIO
HDLC SERPL-MCNC: 69 MG/DL
LDLC SERPL CALC-MCNC: 105 MG/DL
PROT SERPL-MCNC: 6.9 G/DL
TRIGL SERPL-MCNC: 154 MG/DL
TSH SERPL-ACNC: 2.21 UIU/ML

## 2019-08-26 NOTE — DISCUSSION/SUMMARY
[FreeTextEntry1] : HLD\par Hypothyroidism\par Hypercholesterolemia\par Glucose intolerance\par Mild diastolic dysfunction\par Chronic osteoarthritis\par Low  grade Temp r/u viral \par \par Medications reviewed\par Low sugar diet\par Avoid carbohydrates\par Follow-up 2 months well visit and flu vaccination encounter\par \par Follow-up status of mammogram Completed BiRads 1 Next Mammogram Feb 2020\par \par \par

## 2019-08-26 NOTE — REVIEW OF SYSTEMS
[Hypertension] : ~T hypertension [Arthralgias] : arthralgias [Negative] : Psychiatric [Headache] : no headache [Dizziness] : no dizziness [Focal Weakness] : no focal weakness [Numbness] : no numbness [Paralysis] : no paralysis was seen [FreeTextEntry9] : IAN [FreeTextEntry6] : OA [de-identified] : Hypothyroidism, glucose intolerance

## 2019-08-26 NOTE — PHYSICAL EXAM
[General Appearance - Well Developed] : well developed [Normal Appearance] : normal appearance [Well Groomed] : well groomed [General Appearance - Well Nourished] : well nourished [General Appearance - In No Acute Distress] : no acute distress [No Deformities] : no deformities [Normal Conjunctiva] : the conjunctiva exhibited no abnormalities [Normal Oropharynx] : normal oropharynx [Eyelids - No Xanthelasma] : the eyelids demonstrated no xanthelasmas [Neck Appearance] : the appearance of the neck was normal [Neck Cervical Mass (___cm)] : no neck mass was observed [Jugular Venous Distention Increased] : there was no jugular-venous distention [Thyroid Diffuse Enlargement] : the thyroid was not enlarged [Heart Sounds] : normal S1 and S2 [Heart Rate And Rhythm] : heart rate and rhythm were normal [Murmurs] : no murmurs present [Respiration, Rhythm And Depth] : normal respiratory rhythm and effort [Exaggerated Use Of Accessory Muscles For Inspiration] : no accessory muscle use [Auscultation Breath Sounds / Voice Sounds] : lungs were clear to auscultation bilaterally [Chest Palpation] : palpation of the chest revealed no abnormalities [Lungs Percussion] : the lungs were normal to percussion [Abdomen Soft] : soft [Abdomen Tenderness] : non-tender [Abdomen Mass (___ Cm)] : no abdominal mass palpated [Abnormal Walk] : normal gait [Nail Clubbing] : no clubbing of the fingernails [Cyanosis, Localized] : no localized cyanosis [Petechial Hemorrhages (___cm)] : no petechial hemorrhages [] : no ischemic changes [Deep Tendon Reflexes (DTR)] : deep tendon reflexes were 2+ and symmetric [Sensation] : the sensory exam was normal to light touch and pinprick [No Focal Deficits] : no focal deficits [Oriented To Time, Place, And Person] : oriented to person, place, and time [Impaired Insight] : insight and judgment were intact [Affect] : the affect was normal [FreeTextEntry1] : healed shinges over right buttock

## 2019-08-26 NOTE — PROCEDURE
[FreeTextEntry1] : Blood draw\par Hemoglobin A1c 6.2 %  7/10/19\par Glucose fingerstick  104\par \par Urine trace intact blood\par \par Chest x-ray PA and lateral projection Sept 2018\par Cardiac size grossly normal\par Lung fields are grossly clear\par Limitation right hemidiaphragm\par No parenchymal infiltrates\par No dominant or nodules\par No pleural effusion\par No pneumothorax\par Mediastinum and hilum is grossly normal comparison to November 21, 2017 no interval change\par \par EKG NSR GRACIELA Sept 28 2018\par \par Laboratory data review August 26, 2019\par Cholesterol 205 HDL 69 \par Triglycerides 154\par TSH 2.21 normal range\par Liver function testing normal\par \par

## 2019-10-14 ENCOUNTER — RX RENEWAL (OUTPATIENT)
Age: 74
End: 2019-10-14

## 2019-10-17 ENCOUNTER — APPOINTMENT (OUTPATIENT)
Dept: PULMONOLOGY | Facility: CLINIC | Age: 74
End: 2019-10-17
Payer: MEDICARE

## 2019-10-17 ENCOUNTER — NON-APPOINTMENT (OUTPATIENT)
Age: 74
End: 2019-10-17

## 2019-10-17 ENCOUNTER — RX RENEWAL (OUTPATIENT)
Age: 74
End: 2019-10-17

## 2019-10-17 VITALS
DIASTOLIC BLOOD PRESSURE: 80 MMHG | HEIGHT: 65 IN | SYSTOLIC BLOOD PRESSURE: 147 MMHG | HEART RATE: 91 BPM | OXYGEN SATURATION: 97 %

## 2019-10-17 LAB
ALBUMIN SERPL ELPH-MCNC: 4.6 G/DL
ALBUMIN: 10
ALP BLD-CCNC: 66 U/L
ALT SERPL-CCNC: 21 U/L
ANION GAP SERPL CALC-SCNC: 13 MMOL/L
AST SERPL-CCNC: 30 U/L
BASOPHILS # BLD AUTO: 0.05 K/UL
BASOPHILS NFR BLD AUTO: 0.9 %
BILIRUB SERPL-MCNC: 0.2 MG/DL
BILIRUB UR QL STRIP: NEGATIVE
BUN SERPL-MCNC: 16 MG/DL
CALCIUM SERPL-MCNC: 9.5 MG/DL
CHLORIDE SERPL-SCNC: 103 MMOL/L
CHOLEST SERPL-MCNC: 230 MG/DL
CHOLEST/HDLC SERPL: 2.6 RATIO
CLARITY UR: CLEAR
CO2 SERPL-SCNC: 24 MMOL/L
CREAT SERPL-MCNC: 0.66 MG/DL
CREATININE: 50
EOSINOPHIL # BLD AUTO: 0.19 K/UL
EOSINOPHIL NFR BLD AUTO: 3.4 %
ESTIMATED AVERAGE GLUCOSE: 126 MG/DL
GLUCOSE SERPL-MCNC: 109 MG/DL
GLUCOSE UR-MCNC: NEGATIVE
HBA1C MFR BLD HPLC: 6 %
HCG UR QL: 0.2 EU/DL
HCT VFR BLD CALC: 41.1 %
HDLC SERPL-MCNC: 88 MG/DL
HGB BLD-MCNC: 12.7 G/DL
HGB UR QL STRIP.AUTO: NORMAL
IMM GRANULOCYTES NFR BLD AUTO: 0.2 %
KETONES UR-MCNC: NEGATIVE
LDLC SERPL CALC-MCNC: 119 MG/DL
LEUKOCYTE ESTERASE UR QL STRIP: NEGATIVE
LYMPHOCYTES # BLD AUTO: 1.63 K/UL
LYMPHOCYTES NFR BLD AUTO: 29.1 %
MAN DIFF?: NORMAL
MCHC RBC-ENTMCNC: 30.3 PG
MCHC RBC-ENTMCNC: 30.9 GM/DL
MCV RBC AUTO: 98.1 FL
MICROALBUMIN/CREAT UR TEST STR-RTO: NORMAL
MONOCYTES # BLD AUTO: 0.57 K/UL
MONOCYTES NFR BLD AUTO: 10.2 %
NEUTROPHILS # BLD AUTO: 3.16 K/UL
NEUTROPHILS NFR BLD AUTO: 56.2 %
NITRITE UR QL STRIP: NEGATIVE
PH UR STRIP: 5
PLATELET # BLD AUTO: 335 K/UL
POTASSIUM SERPL-SCNC: 4.2 MMOL/L
PROT SERPL-MCNC: 7.2 G/DL
PROT UR STRIP-MCNC: NEGATIVE
RBC # BLD: 4.19 M/UL
RBC # FLD: 12.9 %
SODIUM SERPL-SCNC: 140 MMOL/L
SP GR UR STRIP: <=1.005
T4 FREE SERPL-MCNC: 1.2 NG/DL
T4 SERPL-MCNC: 6.2 UG/DL
TRIGL SERPL-MCNC: 114 MG/DL
TSH SERPL-ACNC: 2.64 UIU/ML
WBC # FLD AUTO: 5.61 K/UL

## 2019-10-17 PROCEDURE — 90653 IIV ADJUVANT VACCINE IM: CPT

## 2019-10-17 PROCEDURE — 99397 PER PM REEVAL EST PAT 65+ YR: CPT | Mod: 25

## 2019-10-17 PROCEDURE — G0008: CPT

## 2019-10-17 PROCEDURE — 81003 URINALYSIS AUTO W/O SCOPE: CPT | Mod: QW

## 2019-10-17 PROCEDURE — 36415 COLL VENOUS BLD VENIPUNCTURE: CPT

## 2019-10-17 PROCEDURE — 82044 UR ALBUMIN SEMIQUANTITATIVE: CPT | Mod: QW

## 2019-10-17 PROCEDURE — 93000 ELECTROCARDIOGRAM COMPLETE: CPT

## 2019-10-18 LAB — 25(OH)D3 SERPL-MCNC: 23.4 NG/ML

## 2019-10-18 RX ORDER — ERGOCALCIFEROL (VITAMIN D2) 50 MCG
50 MCG CAPSULE ORAL
Qty: 90 | Refills: 3 | Status: ACTIVE | COMMUNITY
Start: 2019-10-18

## 2019-10-18 NOTE — DISCUSSION/SUMMARY
[FreeTextEntry1] : Completed well visit\par HLD\par Hypothyroidism\par Hypercholesterolemia\par Glucose intolerance-recheck hemoglobin A1c and UA Jan 2020\par Mild diastolic dysfunction\par Chronic osteoarthritis\par Osteoporosis screening July 2021\par \par Medications reviewed-\par Had discontinued losartan due to recall in this patient\par Avoid ACE inhibitor since patient has an ACE induced cough\par Low-dose Norvasc 2.5 mg with blood pressure recheck\par Low sugar diet\par Avoid carbohydrates\par \par \par \par Follow-up status of mammogram Completed BiRads 1- FGeb 2020 due\par Due colonoscopy-  Thalia Burns MD\par

## 2019-10-18 NOTE — PHYSICAL EXAM
[Normal Appearance] : normal appearance [Well Groomed] : well groomed [General Appearance - Well Developed] : well developed [No Deformities] : no deformities [General Appearance - Well Nourished] : well nourished [General Appearance - In No Acute Distress] : no acute distress [Normal Conjunctiva] : the conjunctiva exhibited no abnormalities [Eyelids - No Xanthelasma] : the eyelids demonstrated no xanthelasmas [Normal Oropharynx] : normal oropharynx [Neck Cervical Mass (___cm)] : no neck mass was observed [Neck Appearance] : the appearance of the neck was normal [Jugular Venous Distention Increased] : there was no jugular-venous distention [Thyroid Diffuse Enlargement] : the thyroid was not enlarged [Heart Sounds] : normal S1 and S2 [Heart Rate And Rhythm] : heart rate and rhythm were normal [Murmurs] : no murmurs present [Respiration, Rhythm And Depth] : normal respiratory rhythm and effort [Exaggerated Use Of Accessory Muscles For Inspiration] : no accessory muscle use [Auscultation Breath Sounds / Voice Sounds] : lungs were clear to auscultation bilaterally [Lungs Percussion] : the lungs were normal to percussion [Chest Palpation] : palpation of the chest revealed no abnormalities [Abdomen Tenderness] : non-tender [Abdomen Soft] : soft [Abdomen Mass (___ Cm)] : no abdominal mass palpated [Nail Clubbing] : no clubbing of the fingernails [Abnormal Walk] : normal gait [Cyanosis, Localized] : no localized cyanosis [Petechial Hemorrhages (___cm)] : no petechial hemorrhages [] : no ischemic changes [Sensation] : the sensory exam was normal to light touch and pinprick [Deep Tendon Reflexes (DTR)] : deep tendon reflexes were 2+ and symmetric [No Focal Deficits] : no focal deficits [Oriented To Time, Place, And Person] : oriented to person, place, and time [Impaired Insight] : insight and judgment were intact [Affect] : the affect was normal [FreeTextEntry1] : healed shinges over right buttock

## 2019-10-18 NOTE — PROCEDURE
[FreeTextEntry1] : Blood draw\par Urine  trace  intact  bld\par Chest x-ray PA lateral October 17, 2019\par Cardiac size normal\par Clear lung fields without parenchymal infiltrates pleural effusions dominant pulmonary nodules\par Soft tissue bony structures demonstrate some thinning of the vertebral spine\par Be distended hilum normal\par Impression clear lungs\par \par EKG NSR RSR Oct  17 2019\par \par Fluad October 17, 2019 administered\par \par Blood  draw\par Data review October 17, 2019\par CBC normal range\par TFTs normal with TSH 2.64\par Lipid profile cholesterol 230 HDL 88 \par Triglycerides 114\par Serum electrolytes normal\par Liver function testing normal\par Hemoglobin A1c 6.0% with mean plasma glucose 126\par Addendum October 18, 2019 vitamin D 23.4

## 2019-10-18 NOTE — HISTORY OF PRESENT ILLNESS
[Difficulty Breathing During Exertion] : denies dyspnea on exertion [Feelings Of Weakness On Exertion] : denies exercise intolerance [Wheezing] : denies wheezing [Cough] : denies coughing [Chest Pain Or Discomfort] : denies chest pain [Regional Soft Tissue Swelling Both Lower Extremities] : denies lower extremity edema [Former] : is a former smoker [Fever] : denies fever [FreeTextEntry1] : left jaw pain\par feels click and harder to open mouth\par  also feels lump in left ear \par  states had placed plug in left ear several nights ago\par  s/p DDS \par Root canal infection and placed on Amoxicillin\par Weight stable\par Appetite normal\par Denies any chronic fatigue\par No respiratory symptomatology\par No chest pain exertional chest pain\par No lower extremity edema\par No active urinary symptoms frequency

## 2019-10-18 NOTE — REVIEW OF SYSTEMS
[Hypertension] : ~T hypertension [Arthralgias] : arthralgias [Negative] : Psychiatric [Headache] : no headache [Dizziness] : no dizziness [Focal Weakness] : no focal weakness [Numbness] : no numbness [Paralysis] : no paralysis was seen [FreeTextEntry9] : IAN [de-identified] : Hypothyroidism, glucose intolerance [FreeTextEntry6] : OA

## 2019-10-21 ENCOUNTER — APPOINTMENT (OUTPATIENT)
Dept: OBGYN | Facility: CLINIC | Age: 74
End: 2019-10-21
Payer: MEDICARE

## 2019-10-21 VITALS
HEIGHT: 65 IN | DIASTOLIC BLOOD PRESSURE: 77 MMHG | WEIGHT: 122 LBS | BODY MASS INDEX: 20.33 KG/M2 | SYSTOLIC BLOOD PRESSURE: 159 MMHG | HEART RATE: 94 BPM

## 2019-10-21 PROCEDURE — 99397 PER PM REEVAL EST PAT 65+ YR: CPT

## 2019-11-04 ENCOUNTER — RX RENEWAL (OUTPATIENT)
Age: 74
End: 2019-11-04

## 2019-11-17 ENCOUNTER — RX RENEWAL (OUTPATIENT)
Age: 74
End: 2019-11-17

## 2019-11-21 ENCOUNTER — APPOINTMENT (OUTPATIENT)
Dept: PULMONOLOGY | Facility: CLINIC | Age: 74
End: 2019-11-21
Payer: MEDICARE

## 2019-11-21 VITALS
OXYGEN SATURATION: 95 % | BODY MASS INDEX: 20.33 KG/M2 | HEART RATE: 74 BPM | SYSTOLIC BLOOD PRESSURE: 151 MMHG | WEIGHT: 122 LBS | HEIGHT: 65 IN | DIASTOLIC BLOOD PRESSURE: 86 MMHG | TEMPERATURE: 98.3 F

## 2019-11-21 LAB — GLUCOSE BLDC GLUCOMTR-MCNC: 112

## 2019-11-21 PROCEDURE — 99213 OFFICE O/P EST LOW 20 MIN: CPT | Mod: 25

## 2019-11-21 PROCEDURE — 82962 GLUCOSE BLOOD TEST: CPT

## 2019-11-21 NOTE — PROCEDURE
[FreeTextEntry1] : \par Chest x-ray PA lateral October 17, 2019\par Cardiac size normal\par Clear lung fields without parenchymal infiltrates pleural effusions dominant pulmonary nodules\par Soft tissue bony structures demonstrate some thinning of the vertebral spine\par Be distended hilum normal\par Impression clear lungs\par \par EKG NSR RSR Oct  17 2019\par \par Fluad October 17, 2019 administered\par \par Blood  draw\par Data review October 17, 2019\par CBC normal range\par TFTs normal with TSH 2.64\par Lipid profile cholesterol 230 HDL 88 \par Triglycerides 114\par Serum electrolytes normal\par Liver function testing normal\par Hemoglobin A1c 6.0% with mean plasma glucose 126\par Addendum October 18, 2019 vitamin D 23.4

## 2019-11-21 NOTE — HISTORY OF PRESENT ILLNESS
[Difficulty Breathing During Exertion] : denies dyspnea on exertion [Feelings Of Weakness On Exertion] : denies exercise intolerance [Cough] : denies coughing [Wheezing] : denies wheezing [Regional Soft Tissue Swelling Both Lower Extremities] : denies lower extremity edema [Chest Pain Or Discomfort] : denies chest pain [Fever] : denies fever [Former] : is a former smoker [FreeTextEntry1] : \par Weight stable\par Appetite normal\par Denies any chronic fatigue\par No respiratory symptomatology\par No chest pain exertional chest pain\par No lower extremity edema\par No active urinary symptoms frequency\par DDS ongoing  management

## 2019-11-21 NOTE — REVIEW OF SYSTEMS
[Hypertension] : ~T hypertension [Arthralgias] : arthralgias [Negative] : Psychiatric [Headache] : no headache [Dizziness] : no dizziness [Focal Weakness] : no focal weakness [Numbness] : no numbness [Paralysis] : no paralysis was seen [FreeTextEntry9] : IAN [FreeTextEntry6] : OA [de-identified] : Hypothyroidism, glucose intolerance

## 2019-12-16 ENCOUNTER — RX RENEWAL (OUTPATIENT)
Age: 74
End: 2019-12-16

## 2020-01-02 ENCOUNTER — APPOINTMENT (OUTPATIENT)
Dept: PULMONOLOGY | Facility: CLINIC | Age: 75
End: 2020-01-02
Payer: MEDICARE

## 2020-01-02 VITALS
BODY MASS INDEX: 20.33 KG/M2 | SYSTOLIC BLOOD PRESSURE: 150 MMHG | WEIGHT: 122 LBS | HEART RATE: 90 BPM | HEIGHT: 65 IN | OXYGEN SATURATION: 100 % | RESPIRATION RATE: 16 BRPM | DIASTOLIC BLOOD PRESSURE: 68 MMHG

## 2020-01-02 LAB
BILIRUB UR QL STRIP: NEGATIVE
CLARITY UR: CLEAR
COLLECTION METHOD: NORMAL
GLUCOSE BLDC GLUCOMTR-MCNC: 124
GLUCOSE UR-MCNC: NEGATIVE
HBA1C MFR BLD HPLC: 6.3
HCG UR QL: 0.2 EU/DL
HGB UR QL STRIP.AUTO: NEGATIVE
KETONES UR-MCNC: NEGATIVE
LEUKOCYTE ESTERASE UR QL STRIP: NEGATIVE
NITRITE UR QL STRIP: NEGATIVE
PH UR STRIP: 5.5
PROT UR STRIP-MCNC: NEGATIVE
SP GR UR STRIP: 1.01

## 2020-01-02 PROCEDURE — 82962 GLUCOSE BLOOD TEST: CPT

## 2020-01-02 PROCEDURE — 99213 OFFICE O/P EST LOW 20 MIN: CPT | Mod: 25

## 2020-01-02 PROCEDURE — 83036 HEMOGLOBIN GLYCOSYLATED A1C: CPT | Mod: QW

## 2020-01-02 PROCEDURE — 81003 URINALYSIS AUTO W/O SCOPE: CPT | Mod: QW

## 2020-01-02 NOTE — DISCUSSION/SUMMARY
[FreeTextEntry1] : Completed well visit Oct 2019\par HLD\par Hypothyroidism\par Hypercholesterolemia\par Glucose intolerance-recheck hemoglobin A1c and UA 2020\par Mild diastolic dysfunction\par Chronic osteoarthritis\par Osteoporosis screening July 2021\par \par Medications reviewed-\par Had discontinued losartan due to recall in this patient\par Avoid ACE inhibitor since patient has an ACE induced cough\par Low-dose Norvasc 2.5 mg with blood pressure recheck\par Low sugar diet\par Avoid carbohydrates\par \par \par \par Follow-up status of mammogram Completed BiRads 1- Feb 2020 due\par Due colonoscopy-  Dennis Burns MD\par

## 2020-01-02 NOTE — PHYSICAL EXAM
[General Appearance - Well Developed] : well developed [Normal Appearance] : normal appearance [Well Groomed] : well groomed [General Appearance - Well Nourished] : well nourished [No Deformities] : no deformities [General Appearance - In No Acute Distress] : no acute distress [Normal Conjunctiva] : the conjunctiva exhibited no abnormalities [Eyelids - No Xanthelasma] : the eyelids demonstrated no xanthelasmas [Normal Oropharynx] : normal oropharynx [Neck Appearance] : the appearance of the neck was normal [Neck Cervical Mass (___cm)] : no neck mass was observed [Jugular Venous Distention Increased] : there was no jugular-venous distention [Thyroid Diffuse Enlargement] : the thyroid was not enlarged [Heart Rate And Rhythm] : heart rate and rhythm were normal [Heart Sounds] : normal S1 and S2 [Murmurs] : no murmurs present [Respiration, Rhythm And Depth] : normal respiratory rhythm and effort [Exaggerated Use Of Accessory Muscles For Inspiration] : no accessory muscle use [Auscultation Breath Sounds / Voice Sounds] : lungs were clear to auscultation bilaterally [Chest Palpation] : palpation of the chest revealed no abnormalities [Lungs Percussion] : the lungs were normal to percussion [Abdomen Soft] : soft [Abdomen Tenderness] : non-tender [Abdomen Mass (___ Cm)] : no abdominal mass palpated [Abnormal Walk] : normal gait [Nail Clubbing] : no clubbing of the fingernails [Cyanosis, Localized] : no localized cyanosis [] : no ischemic changes [Petechial Hemorrhages (___cm)] : no petechial hemorrhages [Deep Tendon Reflexes (DTR)] : deep tendon reflexes were 2+ and symmetric [Sensation] : the sensory exam was normal to light touch and pinprick [No Focal Deficits] : no focal deficits [Oriented To Time, Place, And Person] : oriented to person, place, and time [Impaired Insight] : insight and judgment were intact [Affect] : the affect was normal [FreeTextEntry1] : healed shinges over right buttock

## 2020-01-02 NOTE — REVIEW OF SYSTEMS
[Hypertension] : ~T hypertension [Arthralgias] : arthralgias [Negative] : Psychiatric [Dizziness] : no dizziness [Headache] : no headache [Focal Weakness] : no focal weakness [Paralysis] : no paralysis was seen [Numbness] : no numbness [FreeTextEntry6] : OA [FreeTextEntry9] : IAN [de-identified] : Hypothyroidism, glucose intolerance

## 2020-01-02 NOTE — PROCEDURE
[FreeTextEntry1] : Blood draw\par HGBA!C 6.3 1/2/2020\par  UA  negative\par \par Chest x-ray PA lateral October 17, 2019\par Cardiac size normal\par Clear lung fields without parenchymal infiltrates pleural effusions dominant pulmonary nodules\par Soft tissue bony structures demonstrate some thinning of the vertebral spine\par Be distended hilum normal\par Impression clear lungs\par \par EKG NSR RSR Oct  17 2019\par \par Fluad October 17, 2019 administered\par \par Blood  draw\par Data review October 17, 2019\par CBC normal range\par TFTs normal with TSH 2.64\par Lipid profile cholesterol 230 HDL 88 \par Triglycerides 114\par Serum electrolytes normal\par Liver function testing normal\par Hemoglobin A1c 6.0% with mean plasma glucose 126\par Addendum October 18, 2019 vitamin D 23.4

## 2020-02-10 ENCOUNTER — APPOINTMENT (OUTPATIENT)
Dept: PULMONOLOGY | Facility: CLINIC | Age: 75
End: 2020-02-10
Payer: MEDICARE

## 2020-02-10 VITALS
DIASTOLIC BLOOD PRESSURE: 70 MMHG | TEMPERATURE: 97.6 F | OXYGEN SATURATION: 93 % | HEART RATE: 83 BPM | SYSTOLIC BLOOD PRESSURE: 162 MMHG

## 2020-02-10 PROCEDURE — 99213 OFFICE O/P EST LOW 20 MIN: CPT

## 2020-02-10 NOTE — DISCUSSION/SUMMARY
[FreeTextEntry1] : Completed well visit Oct 2019\par HTN\par HLD\par Hypothyroidism\par Hypercholesterolemia\par Glucose intolerance-recheck hemoglobin A1c and UA 2020\par Mild diastolic dysfunction\par Chronic osteoarthritis\par Osteoporosis screening July 2021\par \par Medications reviewed-\par Had discontinued losartan due to recall in this patient\par Avoid ACE inhibitor since patient has an ACE induced cough\par Low-dose Norvasc 2.5 mg with blood pressure recheck- Increase  Norvasc 5 mg\par Low sugar diet\par Avoid carbohydrates\par \par \par \par Follow-up status of mammogram Completed BiRads 1- Feb 2020 due\par Due colonoscopy-  Dennis Burns MD\par

## 2020-02-10 NOTE — PHYSICAL EXAM
[Normal Appearance] : normal appearance [General Appearance - Well Developed] : well developed [General Appearance - Well Nourished] : well nourished [Well Groomed] : well groomed [No Deformities] : no deformities [General Appearance - In No Acute Distress] : no acute distress [Normal Conjunctiva] : the conjunctiva exhibited no abnormalities [Eyelids - No Xanthelasma] : the eyelids demonstrated no xanthelasmas [Normal Oropharynx] : normal oropharynx [Neck Cervical Mass (___cm)] : no neck mass was observed [Neck Appearance] : the appearance of the neck was normal [Jugular Venous Distention Increased] : there was no jugular-venous distention [Thyroid Diffuse Enlargement] : the thyroid was not enlarged [Heart Sounds] : normal S1 and S2 [Heart Rate And Rhythm] : heart rate and rhythm were normal [Murmurs] : no murmurs present [Respiration, Rhythm And Depth] : normal respiratory rhythm and effort [Exaggerated Use Of Accessory Muscles For Inspiration] : no accessory muscle use [Chest Palpation] : palpation of the chest revealed no abnormalities [Auscultation Breath Sounds / Voice Sounds] : lungs were clear to auscultation bilaterally [Lungs Percussion] : the lungs were normal to percussion [Abdomen Soft] : soft [Abdomen Tenderness] : non-tender [Abdomen Mass (___ Cm)] : no abdominal mass palpated [Nail Clubbing] : no clubbing of the fingernails [Abnormal Walk] : normal gait [Cyanosis, Localized] : no localized cyanosis [Petechial Hemorrhages (___cm)] : no petechial hemorrhages [] : no ischemic changes [Deep Tendon Reflexes (DTR)] : deep tendon reflexes were 2+ and symmetric [Oriented To Time, Place, And Person] : oriented to person, place, and time [No Focal Deficits] : no focal deficits [Sensation] : the sensory exam was normal to light touch and pinprick [Affect] : the affect was normal [Impaired Insight] : insight and judgment were intact [FreeTextEntry1] : healed shinges over right buttock

## 2020-02-10 NOTE — REVIEW OF SYSTEMS
[Arthralgias] : arthralgias [Hypertension] : ~T hypertension [Negative] : Genitourinary/GYN [Headache] : no headache [Focal Weakness] : no focal weakness [Dizziness] : no dizziness [Paralysis] : no paralysis was seen [Numbness] : no numbness [FreeTextEntry9] : IAN [FreeTextEntry6] : OA [de-identified] : Hypothyroidism, glucose intolerance

## 2020-02-10 NOTE — PROCEDURE
[FreeTextEntry1] : Blood draw\par HGBA!C 6.3 1/2/2020\par \par \par Chest x-ray PA lateral October 17, 2019\par Cardiac size normal\par Clear lung fields without parenchymal infiltrates pleural effusions dominant pulmonary nodules\par Soft tissue bony structures demonstrate some thinning of the vertebral spine\par Be distended hilum normal\par Impression clear lungs\par \par EKG NSR RSR Oct  17 2019\par \par Fluad October 17, 2019 administered\par \par Blood  draw\par Data review October 17, 2019\par CBC normal range\par TFTs normal with TSH 2.64\par Lipid profile cholesterol 230 HDL 88 \par Triglycerides 114\par Serum electrolytes normal\par Liver function testing normal\par Hemoglobin A1c 6.0% with mean plasma glucose 126\par Addendum October 18, 2019 vitamin D 23.4

## 2020-02-10 NOTE — HISTORY OF PRESENT ILLNESS
[Difficulty Breathing During Exertion] : denies dyspnea on exertion [Feelings Of Weakness On Exertion] : denies exercise intolerance [Cough] : denies coughing [Wheezing] : denies wheezing [Chest Pain Or Discomfort] : denies chest pain [Regional Soft Tissue Swelling Both Lower Extremities] : denies lower extremity edema [Former] : is a former smoker [Fever] : denies fever [TextBox_4] : Overall feeling good.  [FreeTextEntry1] : \par Weight stable\par Appetite normal\par Denies any chronic fatigue\par No respiratory symptomatology\par No chest pain exertional chest pain\par No lower extremity edema\par No active urinary symptoms frequency\par DDS ongoing  management

## 2020-03-23 ENCOUNTER — APPOINTMENT (OUTPATIENT)
Dept: PULMONOLOGY | Facility: CLINIC | Age: 75
End: 2020-03-23
Payer: MEDICARE

## 2020-03-23 VITALS
TEMPERATURE: 97.4 F | HEART RATE: 104 BPM | DIASTOLIC BLOOD PRESSURE: 79 MMHG | SYSTOLIC BLOOD PRESSURE: 155 MMHG | OXYGEN SATURATION: 98 %

## 2020-03-23 DIAGNOSIS — R73.02 IMPAIRED GLUCOSE TOLERANCE (ORAL): ICD-10-CM

## 2020-03-23 DIAGNOSIS — E74.39 OTHER DISORDERS OF INTESTINAL CARBOHYDRATE ABSORPTION: ICD-10-CM

## 2020-03-23 LAB
CHOLEST SERPL-MCNC: 214 MG/DL
CHOLEST/HDLC SERPL: 2.5 RATIO
HDLC SERPL-MCNC: 87 MG/DL
LDLC SERPL CALC-MCNC: 100 MG/DL
TRIGL SERPL-MCNC: 139 MG/DL

## 2020-03-23 PROCEDURE — 99214 OFFICE O/P EST MOD 30 MIN: CPT | Mod: 25

## 2020-03-23 PROCEDURE — 36415 COLL VENOUS BLD VENIPUNCTURE: CPT

## 2020-03-23 RX ORDER — AMLODIPINE BESYLATE 2.5 MG/1
2.5 TABLET ORAL
Qty: 90 | Refills: 3 | Status: DISCONTINUED | COMMUNITY
Start: 2019-10-17 | End: 2020-03-23

## 2020-03-23 NOTE — REVIEW OF SYSTEMS
[Hypertension] : ~T hypertension [Arthralgias] : arthralgias [Negative] : Psychiatric [Headache] : no headache [Dizziness] : no dizziness [Focal Weakness] : no focal weakness [Numbness] : no numbness [Paralysis] : no paralysis was seen [FreeTextEntry9] : IAN [FreeTextEntry6] : OA [de-identified] : Hypothyroidism, glucose intolerance

## 2020-03-23 NOTE — DISCUSSION/SUMMARY
[FreeTextEntry1] : Completed well visit Oct 2019\par HTN\par HLD\par Hypothyroidism\par Hypercholesterolemia\par Glucose intolerance-recheck hemoglobin A1c and UA 2020\par Mild diastolic dysfunction\par Chronic osteoarthritis\par Osteoporosis screening July 2021\par Continue Norvasc 5 mg\par  Inc Losartan 75 mg QD\par 1 mos BP check\par  \par \par Medications reviewed-\par Had discontinued losartan due to recall in this patient\par Avoid ACE inhibitor since patient has an ACE induced cough\par Low-dose Norvasc 2.5 mg with blood pressure recheck- Increase  Norvasc 5 mg\par Low sugar diet\par Avoid carbohydrates\par \par \par \par Follow-up status of mammogram Completed BiRads 1- Feb 2020 due\par Due colonoscopy-  Dennis Burns MD\par

## 2020-03-23 NOTE — HISTORY OF PRESENT ILLNESS
[Difficulty Breathing During Exertion] : denies dyspnea on exertion [Feelings Of Weakness On Exertion] : denies exercise intolerance [Cough] : denies coughing [Wheezing] : denies wheezing [Regional Soft Tissue Swelling Both Lower Extremities] : denies lower extremity edema [Chest Pain Or Discomfort] : denies chest pain [Fever] : denies fever [Former] : is a former smoker [TextBox_4] : Overall feeling good.  [FreeTextEntry1] : \par Weight stable\par Appetite normal\par Denies any chronic fatigue\par No respiratory symptomatology\par No chest pain exertional chest pain\par No lower extremity edema\par No active urinary symptoms frequency\par DDS ongoing  management

## 2020-03-23 NOTE — PROCEDURE
[FreeTextEntry1] : Blood draw\par HGBA!C 6.3 1/2/2020\par \par Chest x-ray PA lateral October 17, 2019\par Cardiac size normal\par Clear lung fields without parenchymal infiltrates pleural effusions dominant pulmonary nodules\par Soft tissue bony structures demonstrate some thinning of the vertebral spine\par Be distended hilum normal\par Impression clear lungs\par \par EKG NSR RSR Oct  17 2019\par \par Fluad October 17, 2019 administered\par \par Blood  draw\par Data review October 17, 2019\par CBC normal range\par TFTs normal with TSH 2.64\par Lipid profile cholesterol 230 HDL 88 \par Triglycerides 114\par Serum electrolytes normal\par Liver function testing normal\par Hemoglobin A1c 6.0% with mean plasma glucose 126\par Addendum October 18, 2019 vitamin D 23.4

## 2020-05-01 ENCOUNTER — RX RENEWAL (OUTPATIENT)
Age: 75
End: 2020-05-01

## 2020-05-24 ENCOUNTER — RX RENEWAL (OUTPATIENT)
Age: 75
End: 2020-05-24

## 2020-06-22 ENCOUNTER — APPOINTMENT (OUTPATIENT)
Dept: PULMONOLOGY | Facility: CLINIC | Age: 75
End: 2020-06-22
Payer: MEDICARE

## 2020-06-22 ENCOUNTER — RX RENEWAL (OUTPATIENT)
Age: 75
End: 2020-06-22

## 2020-06-22 VITALS
TEMPERATURE: 98.9 F | SYSTOLIC BLOOD PRESSURE: 162 MMHG | HEIGHT: 65 IN | DIASTOLIC BLOOD PRESSURE: 83 MMHG | BODY MASS INDEX: 19.99 KG/M2 | WEIGHT: 120 LBS | RESPIRATION RATE: 16 BRPM | OXYGEN SATURATION: 100 % | HEART RATE: 104 BPM

## 2020-06-22 DIAGNOSIS — Z11.59 ENCOUNTER FOR SCREENING FOR OTHER VIRAL DISEASES: ICD-10-CM

## 2020-06-22 LAB
25(OH)D3 SERPL-MCNC: 30 NG/ML
ALBUMIN SERPL ELPH-MCNC: 4.5 G/DL
ALP BLD-CCNC: 63 U/L
ALT SERPL-CCNC: 20 U/L
ANION GAP SERPL CALC-SCNC: 13 MMOL/L
AST SERPL-CCNC: 27 U/L
BILIRUB DIRECT SERPL-MCNC: 0.1 MG/DL
BILIRUB INDIRECT SERPL-MCNC: 0.2 MG/DL
BILIRUB SERPL-MCNC: 0.2 MG/DL
BUN SERPL-MCNC: 16 MG/DL
CALCIUM SERPL-MCNC: 9.4 MG/DL
CHLORIDE SERPL-SCNC: 107 MMOL/L
CHOLEST SERPL-MCNC: 207 MG/DL
CHOLEST/HDLC SERPL: 2.6 RATIO
CO2 SERPL-SCNC: 23 MMOL/L
CREAT SERPL-MCNC: 0.69 MG/DL
GLUCOSE BLDC GLUCOMTR-MCNC: 127
GLUCOSE SERPL-MCNC: 136 MG/DL
HBA1C MFR BLD HPLC: 6
HDLC SERPL-MCNC: 79 MG/DL
LDLC SERPL CALC-MCNC: 103 MG/DL
POTASSIUM SERPL-SCNC: 4.2 MMOL/L
PROT SERPL-MCNC: 7.1 G/DL
SARS-COV-2 IGG SERPL IA-ACNC: <0.1 INDEX
SARS-COV-2 IGG SERPL QL IA: NEGATIVE
SODIUM SERPL-SCNC: 143 MMOL/L
T4 SERPL-MCNC: 3.9 UG/DL
TRIGL SERPL-MCNC: 126 MG/DL
TSH SERPL-ACNC: 6.81 UIU/ML

## 2020-06-22 PROCEDURE — 83036 HEMOGLOBIN GLYCOSYLATED A1C: CPT | Mod: QW

## 2020-06-22 PROCEDURE — 36415 COLL VENOUS BLD VENIPUNCTURE: CPT

## 2020-06-22 PROCEDURE — 82962 GLUCOSE BLOOD TEST: CPT

## 2020-06-22 PROCEDURE — 99214 OFFICE O/P EST MOD 30 MIN: CPT | Mod: 25

## 2020-06-22 RX ORDER — LEVOTHYROXINE SODIUM 0.07 MG/1
75 TABLET ORAL DAILY
Qty: 90 | Refills: 1 | Status: DISCONTINUED | COMMUNITY
Start: 2018-03-09 | End: 2020-06-22

## 2020-06-22 RX ORDER — LEVOTHYROXINE SODIUM 0.03 MG/1
25 TABLET ORAL DAILY
Qty: 90 | Refills: 0 | Status: DISCONTINUED | COMMUNITY
Start: 2018-03-05 | End: 2020-06-22

## 2020-06-22 NOTE — DISCUSSION/SUMMARY
[FreeTextEntry1] : Completed well visit Oct 2019\par HTN\par HLD\par Hypothyroidism\par Hypercholesterolemia\par Glucose intolerance-recheck hemoglobin A1c and UA 2020\par Mild diastolic dysfunction\par Chronic osteoarthritis\par Osteoporosis screening July 2021\par Continue Norvasc 5 mg\par  Inc Losartan 100  mg\par 1 mos BP check\par Increase Synthroid 88 MCG 1 tablet p.o. daily-recheck thyroid function testing next visit\par  \par Avoid ACE inhibitor since patient has an ACE induced cough\par Low sugar diet\par Avoid carbohydrates\par \par Follow-up status of mammogram Completed BiRads 1- Feb 2020 due\par Due colonoscopy-  Dennis Burns MD\par

## 2020-06-22 NOTE — PROCEDURE
[FreeTextEntry1] : Blood draw\par \par Data review June 22, 2020\par Glucose 2127\par Hemoglobin A1c 6.0\par \par HGBA!C 6.3 1/2/2020\par \par Chest x-ray PA lateral October 17, 2019\par Cardiac size normal\par Clear lung fields without parenchymal infiltrates pleural effusions dominant pulmonary nodules\par Soft tissue bony structures demonstrate some thinning of the vertebral spine\par Be distended hilum normal\par Impression clear lungs\par \par EKG NSR RSR Oct  17 2019\par \par Fluad October 17, 2019 administered\par \par Blood  draw\par Data review June 22, 2020\par Lipid profile\par Cholesterol 207\par \par HDL 79\par Triglycerides 126\par Liver function panel normal range\par Serum glucose 136\par Serum electrolytes normal\par BUN 16 creatinine 0.69\par TSH mildly elevated 6.81\par Vitamin D 30.0\par COVID 19 IgG antibody negative\par Data review October 17, 2019\par CBC normal range\par TFTs normal with TSH 2.64\par Lipid profile cholesterol 230 HDL 88 \par Triglycerides 114\par Serum electrolytes normal\par Liver function testing normal\par Hemoglobin A1c 6.0% with mean plasma glucose 126\par Addendum October 18, 2019 vitamin D 23.4

## 2020-06-22 NOTE — PHYSICAL EXAM
[General Appearance - Well Developed] : well developed [Normal Appearance] : normal appearance [Well Groomed] : well groomed [General Appearance - Well Nourished] : well nourished [No Deformities] : no deformities [General Appearance - In No Acute Distress] : no acute distress [Normal Conjunctiva] : the conjunctiva exhibited no abnormalities [Eyelids - No Xanthelasma] : the eyelids demonstrated no xanthelasmas [Normal Oropharynx] : normal oropharynx [Neck Appearance] : the appearance of the neck was normal [Neck Cervical Mass (___cm)] : no neck mass was observed [Jugular Venous Distention Increased] : there was no jugular-venous distention [Thyroid Diffuse Enlargement] : the thyroid was not enlarged [Heart Rate And Rhythm] : heart rate and rhythm were normal [Heart Sounds] : normal S1 and S2 [Murmurs] : no murmurs present [Exaggerated Use Of Accessory Muscles For Inspiration] : no accessory muscle use [Respiration, Rhythm And Depth] : normal respiratory rhythm and effort [Auscultation Breath Sounds / Voice Sounds] : lungs were clear to auscultation bilaterally [Chest Palpation] : palpation of the chest revealed no abnormalities [Abdomen Soft] : soft [Lungs Percussion] : the lungs were normal to percussion [Abdomen Tenderness] : non-tender [Abdomen Mass (___ Cm)] : no abdominal mass palpated [Abnormal Walk] : normal gait [Nail Clubbing] : no clubbing of the fingernails [Cyanosis, Localized] : no localized cyanosis [Petechial Hemorrhages (___cm)] : no petechial hemorrhages [] : no ischemic changes [Deep Tendon Reflexes (DTR)] : deep tendon reflexes were 2+ and symmetric [No Focal Deficits] : no focal deficits [Sensation] : the sensory exam was normal to light touch and pinprick [Oriented To Time, Place, And Person] : oriented to person, place, and time [Impaired Insight] : insight and judgment were intact [Affect] : the affect was normal [FreeTextEntry1] : healed shinges over right buttock

## 2020-06-22 NOTE — REVIEW OF SYSTEMS
[Hypertension] : ~T hypertension [Arthralgias] : arthralgias [Negative] : Psychiatric [Dizziness] : no dizziness [Headache] : no headache [Numbness] : no numbness [Focal Weakness] : no focal weakness [FreeTextEntry9] : IAN [FreeTextEntry6] : OA [Paralysis] : no paralysis was seen [de-identified] : Hypothyroidism, glucose intolerance

## 2020-06-24 ENCOUNTER — RX RENEWAL (OUTPATIENT)
Age: 75
End: 2020-06-24

## 2020-07-06 NOTE — ED ADULT NURSE NOTE - HARM RISK FACTORS
Patient with left posterior tibial artery PAD. No  Hemodynamically significant stenosis    - Continue home medications ASA and atorvastatin  - PT/OT     no

## 2020-07-24 ENCOUNTER — RX RENEWAL (OUTPATIENT)
Age: 75
End: 2020-07-24

## 2020-07-27 ENCOUNTER — RECORD ABSTRACTING (OUTPATIENT)
Age: 75
End: 2020-07-27

## 2020-07-27 ENCOUNTER — APPOINTMENT (OUTPATIENT)
Dept: PULMONOLOGY | Facility: CLINIC | Age: 75
End: 2020-07-27
Payer: MEDICARE

## 2020-07-27 VITALS
OXYGEN SATURATION: 97 % | RESPIRATION RATE: 14 BRPM | TEMPERATURE: 97.6 F | DIASTOLIC BLOOD PRESSURE: 69 MMHG | HEART RATE: 91 BPM | SYSTOLIC BLOOD PRESSURE: 136 MMHG

## 2020-07-27 LAB
GLUCOSE BLDC GLUCOMTR-MCNC: 90
T4 SERPL-MCNC: 7.9 UG/DL
TSH SERPL-ACNC: 0.08 UIU/ML

## 2020-07-27 PROCEDURE — 82962 GLUCOSE BLOOD TEST: CPT

## 2020-07-27 PROCEDURE — 99214 OFFICE O/P EST MOD 30 MIN: CPT | Mod: 25

## 2020-07-27 PROCEDURE — 36415 COLL VENOUS BLD VENIPUNCTURE: CPT

## 2020-07-27 NOTE — HISTORY OF PRESENT ILLNESS
[Difficulty Breathing During Exertion] : denies dyspnea on exertion [Cough] : denies coughing [Feelings Of Weakness On Exertion] : denies exercise intolerance [Wheezing] : denies wheezing [Fever] : denies fever [Regional Soft Tissue Swelling Both Lower Extremities] : denies lower extremity edema [Chest Pain Or Discomfort] : denies chest pain [Former] : is a former smoker [TextBox_4] : Overall feeling good.  [FreeTextEntry1] : \par Weight stable\par Appetite normal\par Denies any chronic fatigue\par No respiratory symptomatology\par No chest pain exertional chest pain\par No lower extremity edema\par No active urinary symptoms frequency\par DDS ongoing  management

## 2020-07-27 NOTE — PROCEDURE
[FreeTextEntry1] : Blood draw\par POCT july 27 2020\par Glucose 90\par \par Data review June 22, 2020\par Glucose 127\par Hemoglobin A1c 6.0\par \par HGBA!C 6.3 1/2/2020\par \par Chest x-ray PA lateral October 17, 2019\par Cardiac size normal\par Clear lung fields without parenchymal infiltrates pleural effusions dominant pulmonary nodules\par Soft tissue bony structures demonstrate some thinning of the vertebral spine\par Be distended hilum normal\par Impression clear lungs\par \par EKG NSR RSR Oct  17 2019\par \par Fluad October 17, 2019 administered\par \par Blood  draw\par Data review June 22, 2020\par Lipid profile\par Cholesterol 207\par \par HDL 79\par Triglycerides 126\par Liver function panel normal range\par Serum glucose 136\par Serum electrolytes normal\par BUN 16 creatinine 0.69\par TSH mildly elevated 6.81\par Vitamin D 30.0\par COVID 19 IgG antibody negative\par Data review October 17, 2019\par CBC normal range\par TFTs normal with TSH 2.64\par Lipid profile cholesterol 230 HDL 88 \par Triglycerides 114\par Serum electrolytes normal\par Liver function testing normal\par Hemoglobin A1c 6.0% with mean plasma glucose 126\par Addendum October 18, 2019 vitamin D 23.4

## 2020-07-27 NOTE — REVIEW OF SYSTEMS
[Hypertension] : ~T hypertension [Arthralgias] : arthralgias [Negative] : Genitourinary/GYN [Headache] : no headache [Dizziness] : no dizziness [Focal Weakness] : no focal weakness [Numbness] : no numbness [Paralysis] : no paralysis was seen [FreeTextEntry9] : IAN [de-identified] : Hypothyroidism, glucose intolerance [FreeTextEntry6] : OA

## 2020-07-27 NOTE — PHYSICAL EXAM
[General Appearance - Well Developed] : well developed [Normal Appearance] : normal appearance [Well Groomed] : well groomed [No Deformities] : no deformities [General Appearance - Well Nourished] : well nourished [General Appearance - In No Acute Distress] : no acute distress [Normal Conjunctiva] : the conjunctiva exhibited no abnormalities [Normal Oropharynx] : normal oropharynx [Eyelids - No Xanthelasma] : the eyelids demonstrated no xanthelasmas [Neck Appearance] : the appearance of the neck was normal [Neck Cervical Mass (___cm)] : no neck mass was observed [Jugular Venous Distention Increased] : there was no jugular-venous distention [Thyroid Diffuse Enlargement] : the thyroid was not enlarged [Heart Rate And Rhythm] : heart rate and rhythm were normal [Heart Sounds] : normal S1 and S2 [Murmurs] : no murmurs present [Respiration, Rhythm And Depth] : normal respiratory rhythm and effort [Auscultation Breath Sounds / Voice Sounds] : lungs were clear to auscultation bilaterally [Chest Palpation] : palpation of the chest revealed no abnormalities [Lungs Percussion] : the lungs were normal to percussion [Exaggerated Use Of Accessory Muscles For Inspiration] : no accessory muscle use [Abdomen Soft] : soft [Abdomen Mass (___ Cm)] : no abdominal mass palpated [Abdomen Tenderness] : non-tender [Abnormal Walk] : normal gait [Nail Clubbing] : no clubbing of the fingernails [Petechial Hemorrhages (___cm)] : no petechial hemorrhages [Cyanosis, Localized] : no localized cyanosis [] : no ischemic changes [Deep Tendon Reflexes (DTR)] : deep tendon reflexes were 2+ and symmetric [No Focal Deficits] : no focal deficits [Sensation] : the sensory exam was normal to light touch and pinprick [Oriented To Time, Place, And Person] : oriented to person, place, and time [Affect] : the affect was normal [Impaired Insight] : insight and judgment were intact [FreeTextEntry1] : healed shinges over right buttock

## 2020-07-27 NOTE — DISCUSSION/SUMMARY
[FreeTextEntry1] : Completed well visit Oct 2019\par HTN- stable\par HLD\par Hypothyroidism\par Hypercholesterolemia\par Glucose intolerance-recheck hemoglobin A1c and UA 2020\par Mild diastolic dysfunction\par Chronic osteoarthritis\par Osteoporosis screening July 2021\par Continue Norvasc 5 mg\par  Inc Losartan 100  mg\par 1 mos BP check\par Increase Synthroid 88 MCG 1 tablet p.o. daily-recheck thyroid function testing next visit\par  \par Avoid ACE inhibitor since patient has an ACE induced cough\par Low sugar diet\par Avoid carbohydrates\par \par Follow-up status of mammogram Completed BiRads 1- Feb 2020 due\par Due colonoscopy-  Dennis Burns MD\par

## 2020-08-25 NOTE — ED ADULT TRIAGE NOTE - HEIGHT IN FEET
Appropriate affect. Coming to RN asking for scheduled meds and taking willingly. Hopes to be able to stop po Haldol soon. Attending W groups with appropriate participation. Laughing about many x he has drawn on unit walls; \"Do you remember that [place] chart?...Did that scare you?\" Denies suicidal ideation.    5

## 2020-08-27 ENCOUNTER — RX RENEWAL (OUTPATIENT)
Age: 75
End: 2020-08-27

## 2020-09-14 ENCOUNTER — APPOINTMENT (OUTPATIENT)
Dept: PULMONOLOGY | Facility: CLINIC | Age: 75
End: 2020-09-14
Payer: MEDICARE

## 2020-09-14 VITALS
SYSTOLIC BLOOD PRESSURE: 137 MMHG | DIASTOLIC BLOOD PRESSURE: 73 MMHG | OXYGEN SATURATION: 97 % | RESPIRATION RATE: 16 BRPM | TEMPERATURE: 97.9 F | HEART RATE: 90 BPM

## 2020-09-14 LAB
GLUCOSE BLDC GLUCOMTR-MCNC: 83
T4 SERPL-MCNC: 6.5 UG/DL
TSH SERPL-ACNC: 0.07 UIU/ML

## 2020-09-14 PROCEDURE — 99214 OFFICE O/P EST MOD 30 MIN: CPT | Mod: 25

## 2020-09-14 PROCEDURE — G0008: CPT

## 2020-09-14 PROCEDURE — 36415 COLL VENOUS BLD VENIPUNCTURE: CPT

## 2020-09-14 PROCEDURE — 90662 IIV NO PRSV INCREASED AG IM: CPT

## 2020-09-14 PROCEDURE — 82962 GLUCOSE BLOOD TEST: CPT

## 2020-09-14 NOTE — PROCEDURE
[FreeTextEntry1] : POCT 9/14/2020\par Glucose 83\par \par Blood draw\par POCT july 27 2020\par Glucose 90\par \par Data review June 22, 2020\par Glucose 127\par Hemoglobin A1c 6.0\par \par HGBA!C 6.3 1/2/2020\par \par Chest x-ray PA lateral October 17, 2019\par Cardiac size normal\par Clear lung fields without parenchymal infiltrates pleural effusions dominant pulmonary nodules\par Soft tissue bony structures demonstrate some thinning of the vertebral spine\par Be distended hilum normal\par Impression clear lungs\par \par EKG NSR RSR Oct  17 2019\par \par Fluad October 17, 2019 administered\par \par Blood  draw\par \par DATA review 9/14/20\par T4 normal 6.5\par TSH 0.07\par Will change Synthroid dosing 88 MCG to 6 days/week\par Data review June 22, 2020\par Lipid profile\par Cholesterol 207\par \par HDL 79\par Triglycerides 126\par Liver function panel normal range\par Serum glucose 136\par Serum electrolytes normal\par BUN 16 creatinine 0.69\par TSH mildly elevated 6.81\par Vitamin D 30.0\par COVID 19 IgG antibody negative\par Data review October 17, 2019\par CBC normal range\par TFTs normal with TSH 2.64\par Lipid profile cholesterol 230 HDL 88 \par Triglycerides 114\par Serum electrolytes normal\par Liver function testing normal\par Hemoglobin A1c 6.0% with mean plasma glucose 126\par Addendum October 18, 2019 vitamin D 23.4\par \par HD Flu 9/14/20

## 2020-09-14 NOTE — REVIEW OF SYSTEMS
[Hypertension] : ~T hypertension [Arthralgias] : arthralgias [Negative] : Psychiatric [Dizziness] : no dizziness [Headache] : no headache [Focal Weakness] : no focal weakness [Paralysis] : no paralysis was seen [Numbness] : no numbness [de-identified] : Hypothyroidism, glucose intolerance [FreeTextEntry9] : IAN [FreeTextEntry6] : OA

## 2020-09-14 NOTE — PHYSICAL EXAM
[General Appearance - Well Developed] : well developed [Well Groomed] : well groomed [Normal Appearance] : normal appearance [General Appearance - Well Nourished] : well nourished [No Deformities] : no deformities [General Appearance - In No Acute Distress] : no acute distress [Normal Conjunctiva] : the conjunctiva exhibited no abnormalities [Normal Oropharynx] : normal oropharynx [Eyelids - No Xanthelasma] : the eyelids demonstrated no xanthelasmas [Neck Cervical Mass (___cm)] : no neck mass was observed [Neck Appearance] : the appearance of the neck was normal [Jugular Venous Distention Increased] : there was no jugular-venous distention [Thyroid Diffuse Enlargement] : the thyroid was not enlarged [Heart Sounds] : normal S1 and S2 [Heart Rate And Rhythm] : heart rate and rhythm were normal [Murmurs] : no murmurs present [Respiration, Rhythm And Depth] : normal respiratory rhythm and effort [Auscultation Breath Sounds / Voice Sounds] : lungs were clear to auscultation bilaterally [Exaggerated Use Of Accessory Muscles For Inspiration] : no accessory muscle use [Chest Palpation] : palpation of the chest revealed no abnormalities [Abdomen Soft] : soft [Lungs Percussion] : the lungs were normal to percussion [Abdomen Tenderness] : non-tender [Abdomen Mass (___ Cm)] : no abdominal mass palpated [Nail Clubbing] : no clubbing of the fingernails [Abnormal Walk] : normal gait [Petechial Hemorrhages (___cm)] : no petechial hemorrhages [Cyanosis, Localized] : no localized cyanosis [] : no ischemic changes [Sensation] : the sensory exam was normal to light touch and pinprick [No Focal Deficits] : no focal deficits [Deep Tendon Reflexes (DTR)] : deep tendon reflexes were 2+ and symmetric [Impaired Insight] : insight and judgment were intact [Oriented To Time, Place, And Person] : oriented to person, place, and time [Affect] : the affect was normal [FreeTextEntry1] : healed shinges over right buttock

## 2020-09-14 NOTE — DISCUSSION/SUMMARY
[FreeTextEntry1] : Completed well visit Oct 2019- f/u Next  Visit\par HTN- stable\par HLD\par Hypothyroidism\par Hypercholesterolemia\par Glucose intolerance-recheck hemoglobin A1c and UA 2020\par Mild diastolic dysfunction\par Chronic osteoarthritis\par Osteoporosis screening July 2021\par Continue Norvasc 5 mg\par  Inc Losartan 100  mg\par 1 mos BP check\par I change dosing to 6 days/week Synthroid 88 MCG 1 tablet p.o. daily-recheck thyroid function testing next visit- \par  \par Avoid ACE inhibitor since patient has an ACE induced cough\par Low sugar diet\par Avoid carbohydrates\par \par Follow-up status of mammogram Completed BiRads 1- Feb 2020 due\par Due colonoscopy-  Dennis Burns MD\par

## 2020-09-14 NOTE — HISTORY OF PRESENT ILLNESS
[Difficulty Breathing During Exertion] : denies dyspnea on exertion [Feelings Of Weakness On Exertion] : denies exercise intolerance [Cough] : denies coughing [Wheezing] : denies wheezing [Regional Soft Tissue Swelling Both Lower Extremities] : denies lower extremity edema [Chest Pain Or Discomfort] : denies chest pain [Former] : is a former smoker [Fever] : denies fever [TextBox_4] : Overall feeling good.  [FreeTextEntry1] : \par Weight stable\par Appetite normal\par Denies any chronic fatigue\par No respiratory symptomatology\par No chest pain exertional chest pain\par No lower extremity edema\par No active urinary symptoms frequency\par DDS ongoing  management

## 2020-09-18 ENCOUNTER — RX RENEWAL (OUTPATIENT)
Age: 75
End: 2020-09-18

## 2020-10-12 ENCOUNTER — APPOINTMENT (OUTPATIENT)
Dept: PULMONOLOGY | Facility: CLINIC | Age: 75
End: 2020-10-12
Payer: MEDICARE

## 2020-10-12 ENCOUNTER — NON-APPOINTMENT (OUTPATIENT)
Age: 75
End: 2020-10-12

## 2020-10-12 VITALS
WEIGHT: 120 LBS | HEIGHT: 65 IN | BODY MASS INDEX: 19.99 KG/M2 | OXYGEN SATURATION: 97 % | TEMPERATURE: 97.5 F | HEART RATE: 82 BPM | DIASTOLIC BLOOD PRESSURE: 75 MMHG | SYSTOLIC BLOOD PRESSURE: 157 MMHG

## 2020-10-12 VITALS — SYSTOLIC BLOOD PRESSURE: 120 MMHG | DIASTOLIC BLOOD PRESSURE: 76 MMHG

## 2020-10-12 LAB
25(OH)D3 SERPL-MCNC: 52.5 NG/ML
ALBUMIN SERPL ELPH-MCNC: 4.7 G/DL
ALBUMIN: 10
ALP BLD-CCNC: 67 U/L
ALT SERPL-CCNC: 19 U/L
ANION GAP SERPL CALC-SCNC: 11 MMOL/L
AST SERPL-CCNC: 26 U/L
BASOPHILS # BLD AUTO: 0.05 K/UL
BASOPHILS NFR BLD AUTO: 0.8 %
BILIRUB SERPL-MCNC: 0.2 MG/DL
BILIRUB UR QL STRIP: NORMAL
BUN SERPL-MCNC: 12 MG/DL
CALCIUM SERPL-MCNC: 9.6 MG/DL
CHLORIDE SERPL-SCNC: 104 MMOL/L
CHOLEST SERPL-MCNC: 231 MG/DL
CHOLEST/HDLC SERPL: 2.6 RATIO
CLARITY UR: CLEAR
CO2 SERPL-SCNC: 27 MMOL/L
COLLECTION METHOD: NORMAL
CREAT SERPL-MCNC: 0.59 MG/DL
CREATININE: 50
EOSINOPHIL # BLD AUTO: 0.21 K/UL
EOSINOPHIL NFR BLD AUTO: 3.5 %
ESTIMATED AVERAGE GLUCOSE: 131 MG/DL
GLUCOSE SERPL-MCNC: 81 MG/DL
GLUCOSE UR-MCNC: NORMAL
HBA1C MFR BLD HPLC: 6.2 %
HCG UR QL: 0.2 EU/DL
HCT VFR BLD CALC: 42.6 %
HDLC SERPL-MCNC: 90 MG/DL
HGB BLD-MCNC: 13.4 G/DL
HGB UR QL STRIP.AUTO: NORMAL
IMM GRANULOCYTES NFR BLD AUTO: 0.2 %
KETONES UR-MCNC: NORMAL
LDLC SERPL CALC-MCNC: 119 MG/DL
LEUKOCYTE ESTERASE UR QL STRIP: NORMAL
LYMPHOCYTES # BLD AUTO: 1.77 K/UL
LYMPHOCYTES NFR BLD AUTO: 29.5 %
MAN DIFF?: NORMAL
MCHC RBC-ENTMCNC: 30.5 PG
MCHC RBC-ENTMCNC: 31.5 GM/DL
MCV RBC AUTO: 96.8 FL
MICROALBUMIN/CREAT UR TEST STR-RTO: NORMAL
MONOCYTES # BLD AUTO: 0.6 K/UL
MONOCYTES NFR BLD AUTO: 10 %
NEUTROPHILS # BLD AUTO: 3.36 K/UL
NEUTROPHILS NFR BLD AUTO: 56 %
NITRITE UR QL STRIP: NORMAL
PH UR STRIP: 5
PLATELET # BLD AUTO: 351 K/UL
POTASSIUM SERPL-SCNC: 4.7 MMOL/L
PROT SERPL-MCNC: 7.2 G/DL
PROT UR STRIP-MCNC: NORMAL
RBC # BLD: 4.4 M/UL
RBC # FLD: 12.6 %
SODIUM SERPL-SCNC: 142 MMOL/L
SP GR UR STRIP: 1.01
T4 FREE SERPL-MCNC: 1 NG/DL
T4 SERPL-MCNC: 5.9 UG/DL
TRIGL SERPL-MCNC: 107 MG/DL
TSH SERPL-ACNC: 1.11 UIU/ML
WBC # FLD AUTO: 6 K/UL

## 2020-10-12 PROCEDURE — 81003 URINALYSIS AUTO W/O SCOPE: CPT | Mod: QW

## 2020-10-12 PROCEDURE — G0009: CPT

## 2020-10-12 PROCEDURE — 36415 COLL VENOUS BLD VENIPUNCTURE: CPT

## 2020-10-12 PROCEDURE — 90670 PCV13 VACCINE IM: CPT

## 2020-10-12 PROCEDURE — 82044 UR ALBUMIN SEMIQUANTITATIVE: CPT | Mod: QW

## 2020-10-12 PROCEDURE — 99397 PER PM REEVAL EST PAT 65+ YR: CPT | Mod: 25

## 2020-10-12 PROCEDURE — 93000 ELECTROCARDIOGRAM COMPLETE: CPT

## 2020-10-12 NOTE — DISCUSSION/SUMMARY
[FreeTextEntry1] : Completed well visit Oct 2019- f/u Next  Visit\par HTN- stable\par HLD\par Hypothyroidism\par Hypercholesterolemia\par PreDM\par Mild diastolic dysfunction\par Chronic osteoarthritis\par Osteoporosis screening July 2021\par Continue Norvasc 5 mg\par  Inc Losartan 100  mg\par 1 mos BP check\par I change dosing to 6 days/week Synthroid 88 MCG 1 tablet p.o. daily-recheck thyroid function testing next visit- \par  \par Avoid ACE inhibitor since patient has an ACE induced cough\par Low sugar diet\par Avoid carbohydrates\par \par Follow-up status of mammogram Completed BiRads 1- Feb 2020 due\par Due colonoscopy-  Dennis Burns MD\par \par GYN DEXA and Mammogram  management\par f/u baseline CXR

## 2020-10-12 NOTE — PROCEDURE
[FreeTextEntry1] : POCT 9/14/2020\par Glucose 83\par \par Blood draw\par POCT july 27 2020\par Glucose 90\par \par Data review June 22, 2020\par Glucose 127\par Hemoglobin A1c 6.0\par \par HGBA!C 6.3 1/2/2020\par \par Chest x-ray PA lateral October 17, 2019\par Cardiac size normal\par Clear lung fields without parenchymal infiltrates pleural effusions dominant pulmonary nodules\par Soft tissue bony structures demonstrate some thinning of the vertebral spine\par Be distended hilum normal\par Impression clear lungs\par \par EKG NSR RSR Oct  12  2020\par NSSST \par Likely  normal  for  age\par \par Blood  draw\par \par DATA review 9/14/20\par T4 normal 6.5\par TSH 0.07\par Will change Synthroid dosing 88 MCG to 6 days/week\par Data review June 22, 2020\par Lipid profile\par Cholesterol 207\par \par HDL 79\par Triglycerides 126\par Liver function panel normal range\par Serum glucose 136\par Serum electrolytes normal\par BUN 16 creatinine 0.69\par TSH mildly elevated 6.81\par Vitamin D 30.0\par COVID 19 IgG antibody negative\par Data review October 17, 2019\par CBC normal range\par TFTs normal with TSH 2.64\par Lipid profile cholesterol 230 HDL 88 \par Triglycerides 114\par Serum electrolytes normal\par Liver function testing normal\par Hemoglobin A1c 6.0% with mean plasma glucose 126\par Addendum October 18, 2019 vitamin D 23.4\par \par HD Flu 9/14/20\par Prevnair IM 10/12/2020

## 2020-10-12 NOTE — REVIEW OF SYSTEMS
[Hypertension] : ~T hypertension [Arthralgias] : arthralgias [Negative] : Psychiatric [Headache] : no headache [Dizziness] : no dizziness [Focal Weakness] : no focal weakness [Numbness] : no numbness [Paralysis] : no paralysis was seen [FreeTextEntry9] : IAN [FreeTextEntry6] : OA [de-identified] : Hypothyroidism, glucose intolerance

## 2020-10-26 ENCOUNTER — APPOINTMENT (OUTPATIENT)
Dept: PULMONOLOGY | Facility: CLINIC | Age: 75
End: 2020-10-26
Payer: MEDICARE

## 2020-10-26 ENCOUNTER — APPOINTMENT (OUTPATIENT)
Dept: OBGYN | Facility: CLINIC | Age: 75
End: 2020-10-26

## 2020-10-26 VITALS
HEIGHT: 65 IN | TEMPERATURE: 98.2 F | HEART RATE: 91 BPM | SYSTOLIC BLOOD PRESSURE: 117 MMHG | WEIGHT: 120 LBS | BODY MASS INDEX: 19.99 KG/M2 | OXYGEN SATURATION: 94 % | DIASTOLIC BLOOD PRESSURE: 67 MMHG

## 2020-10-26 LAB
ALBUMIN: 10
BASOPHILS # BLD AUTO: 0.05 K/UL
BASOPHILS NFR BLD AUTO: 0.8 %
BILIRUB UR QL STRIP: NEGATIVE
CLARITY UR: CLEAR
COLLECTION METHOD: NORMAL
CREATININE: 100
EOSINOPHIL # BLD AUTO: 0.16 K/UL
EOSINOPHIL NFR BLD AUTO: 2.6 %
GLUCOSE UR-MCNC: NEGATIVE
HCG UR QL: 0.2 EU/DL
HCT VFR BLD CALC: 42.7 %
HGB BLD-MCNC: 13.2 G/DL
HGB UR QL STRIP.AUTO: NORMAL
IMM GRANULOCYTES NFR BLD AUTO: 0.3 %
KETONES UR-MCNC: NEGATIVE
LEUKOCYTE ESTERASE UR QL STRIP: NEGATIVE
LYMPHOCYTES # BLD AUTO: 1.62 K/UL
LYMPHOCYTES NFR BLD AUTO: 26.5 %
MAN DIFF?: NORMAL
MCHC RBC-ENTMCNC: 30.1 PG
MCHC RBC-ENTMCNC: 30.9 GM/DL
MCV RBC AUTO: 97.5 FL
MICROALBUMIN/CREAT UR TEST STR-RTO: <30
MONOCYTES # BLD AUTO: 0.52 K/UL
MONOCYTES NFR BLD AUTO: 8.5 %
NEUTROPHILS # BLD AUTO: 3.75 K/UL
NEUTROPHILS NFR BLD AUTO: 61.3 %
NITRITE UR QL STRIP: NEGATIVE
PH UR STRIP: 5.5
PLATELET # BLD AUTO: 364 K/UL
PROT UR STRIP-MCNC: NEGATIVE
RBC # BLD: 4.38 M/UL
RBC # FLD: 13.1 %
SP GR UR STRIP: 1.02
WBC # FLD AUTO: 6.12 K/UL

## 2020-10-26 PROCEDURE — 99072 ADDL SUPL MATRL&STAF TM PHE: CPT

## 2020-10-26 PROCEDURE — 81003 URINALYSIS AUTO W/O SCOPE: CPT | Mod: QW

## 2020-10-26 PROCEDURE — 93000 ELECTROCARDIOGRAM COMPLETE: CPT

## 2020-10-26 PROCEDURE — 99397 PER PM REEVAL EST PAT 65+ YR: CPT | Mod: 25

## 2020-10-26 PROCEDURE — 71046 X-RAY EXAM CHEST 2 VIEWS: CPT

## 2020-10-26 PROCEDURE — 36415 COLL VENOUS BLD VENIPUNCTURE: CPT

## 2020-10-26 PROCEDURE — 82044 UR ALBUMIN SEMIQUANTITATIVE: CPT | Mod: QW

## 2020-10-26 NOTE — REVIEW OF SYSTEMS
[Hypertension] : ~T hypertension [Arthralgias] : arthralgias [Negative] : Psychiatric [Headache] : no headache [Dizziness] : no dizziness [Focal Weakness] : no focal weakness [Numbness] : no numbness [Paralysis] : no paralysis was seen [FreeTextEntry9] : IAN [FreeTextEntry6] : OA [de-identified] : Hypothyroidism, glucose intolerance

## 2020-10-26 NOTE — DISCUSSION/SUMMARY
[FreeTextEntry1] : Completed well visit \par HTN- stable\par HLD\par Hypothyroidism\par Hypercholesterolemia\par PreDM\par Mild diastolic dysfunction\par Chronic osteoarthritis\par Osteoporosis screening July 2021\par Continue Norvasc 5 mg\par  Inc Losartan 100  mg\par 1 mos BP check\par I change dosing to 6 days/week Synthroid 88 MCG 1 tablet p.o. daily-recheck thyroid function testing next visit- \par  \par Avoid ACE inhibitor since patient has an ACE induced cough\par Low sugar diet\par Avoid carbohydrates\par \par Follow-up status of mammogram Completed BiRads 1- Feb 2020  see  below\par Due colonoscopy-  Dennis Burns MD\par \par GYN DEXA and Mammogram  management Nov 10 2020 is  set [facility]\par

## 2020-10-26 NOTE — PROCEDURE
[FreeTextEntry1] : POCT 9/14/2020\par Glucose 83\par \par Blood draw\par POCT july 27 2020\par Glucose 90\par \par Data review June 22, 2020\par Glucose 127\par Hemoglobin A1c 6.0\par \par HGBA!C 6.3 1/2/2020\par \par Chest x-ray PA lateral October 17, 2019\par Cardiac size normal\par Clear lung fields without parenchymal infiltrates pleural effusions dominant pulmonary nodules\par Soft tissue bony structures demonstrate some thinning of the vertebral spine\par Be distended hilum normal\par Impression clear lungs\par \par EKG NSR RSR Oct  12  2020\par NSSST \par Likely  normal  for  age\par \par Blood  draw\par \par DATA review 9/14/20\par T4 normal 6.5\par TSH 0.07\par Will change Synthroid dosing 88 MCG to 6 days/week\par Data review June 22, 2020\par Lipid profile\par Cholesterol 207\par \par HDL 79\par Triglycerides 126\par Liver function panel normal range\par Serum glucose 136\par Serum electrolytes normal\par BUN 16 creatinine 0.69\par TSH mildly elevated 6.81\par Vitamin D 30.0\par COVID 19 IgG antibody negative\par Data review October 17, 2019\par CBC normal range\par TFTs normal with TSH 2.64\par Lipid profile cholesterol 230 HDL 88 \par Triglycerides 114\par Serum electrolytes normal\par Liver function testing normal\par Hemoglobin A1c 6.0% with mean plasma glucose 126\par Addendum October 18, 2019 vitamin D 23.4\par \par HD Flu 9/14/20\par Prevnair IM 10/12/2020\par \par Chest x-ray PA lateral October 26, 2020\par Normal cardiac size\par Clear lung fields\par Lamination bilateral right and left hemidiaphragm\par No parenchymal infiltrate pleural effusions dominant pulmonary nodules\par Soft tissue bony structures demonstrate some thinning vertebral spine\par Very minimal scoliosis\par Nida mediastinum unremarkable\par Impression clear lungs\par \par EKG 10/26/2020\par  Normal for  age

## 2020-10-27 LAB
25(OH)D3 SERPL-MCNC: 40.5 NG/ML
ALBUMIN SERPL ELPH-MCNC: 4.5 G/DL
ALP BLD-CCNC: 73 U/L
ALT SERPL-CCNC: 15 U/L
ANION GAP SERPL CALC-SCNC: 18 MMOL/L
AST SERPL-CCNC: 26 U/L
BILIRUB SERPL-MCNC: <0.2 MG/DL
BUN SERPL-MCNC: 14 MG/DL
CALCIUM SERPL-MCNC: 9.1 MG/DL
CHLORIDE SERPL-SCNC: 106 MMOL/L
CHOLEST SERPL-MCNC: 227 MG/DL
CO2 SERPL-SCNC: 18 MMOL/L
CREAT SERPL-MCNC: 0.69 MG/DL
ESTIMATED AVERAGE GLUCOSE: 126 MG/DL
GLUCOSE SERPL-MCNC: 136 MG/DL
HBA1C MFR BLD HPLC: 6 %
HDLC SERPL-MCNC: 82 MG/DL
LDLC SERPL CALC-MCNC: 114 MG/DL
NONHDLC SERPL-MCNC: 145 MG/DL
POTASSIUM SERPL-SCNC: 4.3 MMOL/L
PROT SERPL-MCNC: 6.9 G/DL
SODIUM SERPL-SCNC: 142 MMOL/L
T3 SERPL-MCNC: 64 NG/DL
T4 SERPL-MCNC: 5.5 UG/DL
TRIGL SERPL-MCNC: 157 MG/DL
TSH SERPL-ACNC: 1.79 UIU/ML

## 2020-11-18 ENCOUNTER — APPOINTMENT (OUTPATIENT)
Dept: OBGYN | Facility: CLINIC | Age: 75
End: 2020-11-18
Payer: MEDICARE

## 2020-11-18 VITALS
DIASTOLIC BLOOD PRESSURE: 77 MMHG | HEART RATE: 93 BPM | SYSTOLIC BLOOD PRESSURE: 149 MMHG | BODY MASS INDEX: 19.49 KG/M2 | WEIGHT: 117 LBS | TEMPERATURE: 98 F | HEIGHT: 65 IN

## 2020-11-18 DIAGNOSIS — Z01.419 ENCOUNTER FOR GYNECOLOGICAL EXAMINATION (GENERAL) (ROUTINE) W/OUT ABNORMAL FINDINGS: ICD-10-CM

## 2020-11-18 PROCEDURE — 99397 PER PM REEVAL EST PAT 65+ YR: CPT

## 2020-11-18 NOTE — HISTORY OF PRESENT ILLNESS
[FreeTextEntry1] : 75 y.o. P 2 postmenopausal female for  annual exam. pt feels well. PMH  - pt has hx of HTN, Hypothyroidism, and Hypercholesterolemia.  , taking Losartan , Synthroid and Pravastatin. - PCP is Roger Dial. PSHx - negative, FH - negative, SH - negative, OB hx -  x 2, GYN hx -  neg. ROS - pt offers no complaints today. pt is going to use COLOGARD this year and send specimen back to PCP's office. .

## 2020-11-18 NOTE — DISCUSSION/SUMMARY
[FreeTextEntry1] : A - WWV\par       menopause\par       HTN\par      Hypercholesterolemia\par      Hypothyroidism\par \par P- f/u 1 year\par      no longer requires paps\par      referrals for mammo and Dexa given\par      exercise encouraged. pt goes to [employer]\par       nutritional counseling provided \par      cotinue  meds as prescribed , and turn in COLOGARD as requested to office of PCP\par       son is 57, daughter is 53, and pt has 2 grandchildren 12 and 8 , a boy and a girl.  \par

## 2020-11-18 NOTE — PHYSICAL EXAM
[Appropriately responsive] : appropriately responsive [Alert] : alert [No Acute Distress] : no acute distress [No Lymphadenopathy] : no lymphadenopathy [Regular Rate Rhythm] : regular rate rhythm [No Murmurs] : no murmurs [Clear to Auscultation B/L] : clear to auscultation bilaterally [Soft] : soft [Non-tender] : non-tender [Non-distended] : non-distended [No HSM] : No HSM [No Lesions] : no lesions [No Mass] : no mass [Oriented x3] : oriented x3 [Examination Of The Breasts] : a normal appearance [No Masses] : no breast masses were palpable [Vulvar Atrophy] : vulvar atrophy [Labia Majora] : normal [Labia Minora] : normal [Atrophy] : atrophy [Normal] : normal [Uterine Adnexae] : normal

## 2020-11-30 ENCOUNTER — APPOINTMENT (OUTPATIENT)
Dept: PULMONOLOGY | Facility: CLINIC | Age: 75
End: 2020-11-30
Payer: MEDICARE

## 2020-11-30 VITALS
OXYGEN SATURATION: 97 % | HEART RATE: 100 BPM | SYSTOLIC BLOOD PRESSURE: 140 MMHG | TEMPERATURE: 98.2 F | DIASTOLIC BLOOD PRESSURE: 71 MMHG

## 2020-11-30 LAB
GLUCOSE BLDC GLUCOMTR-MCNC: 124
HBA1C MFR BLD HPLC: 5.8

## 2020-11-30 PROCEDURE — 83036 HEMOGLOBIN GLYCOSYLATED A1C: CPT | Mod: QW

## 2020-11-30 PROCEDURE — 99214 OFFICE O/P EST MOD 30 MIN: CPT | Mod: 25

## 2020-11-30 PROCEDURE — 82962 GLUCOSE BLOOD TEST: CPT

## 2020-11-30 PROCEDURE — 99072 ADDL SUPL MATRL&STAF TM PHE: CPT

## 2020-11-30 NOTE — REVIEW OF SYSTEMS
[Hypertension] : ~T hypertension [Arthralgias] : arthralgias [Negative] : Psychiatric [Headache] : no headache [Dizziness] : no dizziness [Focal Weakness] : no focal weakness [Numbness] : no numbness [Paralysis] : no paralysis was seen [FreeTextEntry9] : IAN [FreeTextEntry6] : OA [de-identified] : Hypothyroidism, glucose intolerance

## 2020-11-30 NOTE — DISCUSSION/SUMMARY
[FreeTextEntry1] : Completed well visit Oct 26 2020\par HTN- stable\par HLD\par Hypothyroidism\par Hypercholesterolemia\par PreDM\par Mild diastolic dysfunction\par Chronic osteoarthritis\par Osteoporosis screening July 2021\par Continue Norvasc 5 mg\par  Inc Losartan 100  mg\par 1 mos BP check make sure taking generic  Norvasc\par I change dosing to 6 days/week Synthroid 88 MCG 1 tablet p.o. daily-recheck thyroid function testing next visit- \par  \par Avoid ACE inhibitor since patient has an ACE induced cough\par Low sugar diet\par Avoid carbohydrates\par \par Follow-up status of mammogram Completed BiRads 1- Feb 2020  see  below\par Due colonoscopy-  Dennis Burns MD\par \par GYN DEXA and Mammogram  management Nov 10 2020 is  set [facility]\par

## 2020-11-30 NOTE — PROCEDURE
[FreeTextEntry1] : POCT 11/30/2020\par Glucose   124\par HGBA1C   5.8\par \par POCT 9/14/2020\par Glucose 83\par \par Blood draw\par POCT july 27 2020\par Glucose 90\par \par Data review June 22, 2020\par Glucose 127\par Hemoglobin A1c 6.0\par \par HGBA!C 6.3 1/2/2020\par \par Chest x-ray PA lateral October 17, 2019\par Cardiac size normal\par Clear lung fields without parenchymal infiltrates pleural effusions dominant pulmonary nodules\par Soft tissue bony structures demonstrate some thinning of the vertebral spine\par Be distended hilum normal\par Impression clear lungs\par \par EKG NSR RSR Oct  12  2020\par NSSST \par Likely  normal  for  age\par \par Blood  draw\par \par DATA review 9/14/20\par T4 normal 6.5\par TSH 0.07\par Will change Synthroid dosing 88 MCG to 6 days/week\par Data review June 22, 2020\par Lipid profile\par Cholesterol 207\par \par HDL 79\par Triglycerides 126\par Liver function panel normal range\par Serum glucose 136\par Serum electrolytes normal\par BUN 16 creatinine 0.69\par TSH mildly elevated 6.81\par Vitamin D 30.0\par COVID 19 IgG antibody negative\par Data review October 17, 2019\par CBC normal range\par TFTs normal with TSH 2.64\par Lipid profile cholesterol 230 HDL 88 \par Triglycerides 114\par Serum electrolytes normal\par Liver function testing normal\par Hemoglobin A1c 6.0% with mean plasma glucose 126\par Addendum October 18, 2019 vitamin D 23.4\par \par HD Flu 9/14/20\par Prevnair IM 10/12/2020\par \par Chest x-ray PA lateral October 26, 2020\par Normal cardiac size\par Clear lung fields\par Lamination bilateral right and left hemidiaphragm\par No parenchymal infiltrate pleural effusions dominant pulmonary nodules\par Soft tissue bony structures demonstrate some thinning vertebral spine\par Very minimal scoliosis\par Nida mediastinum unremarkable\par Impression clear lungs\par \par EKG 10/26/2020\par  Normal for  age

## 2021-01-31 ENCOUNTER — RX RENEWAL (OUTPATIENT)
Age: 76
End: 2021-01-31

## 2021-05-11 ENCOUNTER — RX RENEWAL (OUTPATIENT)
Age: 76
End: 2021-05-11

## 2021-06-02 NOTE — HISTORY OF PRESENT ILLNESS
[Difficulty Breathing During Exertion] : denies dyspnea on exertion [Feelings Of Weakness On Exertion] : denies exercise intolerance [Cough] : denies coughing [Wheezing] : denies wheezing [Regional Soft Tissue Swelling Both Lower Extremities] : denies lower extremity edema [Chest Pain Or Discomfort] : denies chest pain [Fever] : denies fever [Former] : is a former smoker [TextBox_4] : Overall feeling good.  [FreeTextEntry1] : \par Weight stable\par Appetite normal\par Denies any chronic fatigue\par No respiratory symptomatology\par No chest pain exertional chest pain\par No lower extremity edema\par No active urinary symptoms frequency\par DDS ongoing  management Self

## 2021-06-23 ENCOUNTER — APPOINTMENT (OUTPATIENT)
Dept: PULMONOLOGY | Facility: CLINIC | Age: 76
End: 2021-06-23
Payer: MEDICARE

## 2021-06-23 ENCOUNTER — LABORATORY RESULT (OUTPATIENT)
Age: 76
End: 2021-06-23

## 2021-06-23 VITALS
DIASTOLIC BLOOD PRESSURE: 66 MMHG | OXYGEN SATURATION: 96 % | HEART RATE: 83 BPM | TEMPERATURE: 98.1 F | SYSTOLIC BLOOD PRESSURE: 120 MMHG

## 2021-06-23 PROCEDURE — 36415 COLL VENOUS BLD VENIPUNCTURE: CPT

## 2021-06-23 PROCEDURE — 99213 OFFICE O/P EST LOW 20 MIN: CPT | Mod: 25

## 2021-06-23 NOTE — DISCUSSION/SUMMARY
[FreeTextEntry1] : Completed well visit Oct 26 2020\par HTN- stable\par HLD\par Hypothyroidism\par Hypercholesterolemia\par PreDM\par Mild diastolic dysfunction\par Chronic osteoarthritis\par Osteoporosis screening/ per GYN\par Continue Norvasc 5 mg\par  Inc Losartan 100  mg\par 1 mos BP check make sure taking generic  Norvasc\par I change dosing to 6 days/week Synthroid 88 MCG 1 tablet p.o. daily-recheck thyroid function testing next visit- \par  \par Avoid ACE inhibitor since patient has an ACE induced cough\par Low sugar diet\par Avoid carbohydrates\par \par Follow-up status of mammogram Completed  noted  management GYN\par Due colonoscopy-  Dennis Burns MD\par \par GYN DEXA and Mammogram  management Nov 10 2020 is  set [facility]\par

## 2021-06-23 NOTE — REVIEW OF SYSTEMS
[Hypertension] : ~T hypertension [Arthralgias] : arthralgias [Negative] : Psychiatric [Headache] : no headache [Dizziness] : no dizziness [Focal Weakness] : no focal weakness [Numbness] : no numbness [Paralysis] : no paralysis was seen [FreeTextEntry9] : IAN [FreeTextEntry6] : OA [de-identified] : Hypothyroidism, glucose intolerance

## 2021-06-23 NOTE — PHYSICAL EXAM
[Normal Appearance] : normal appearance [General Appearance - Well Developed] : well developed [Well Groomed] : well groomed [General Appearance - Well Nourished] : well nourished [No Deformities] : no deformities [General Appearance - In No Acute Distress] : no acute distress [Normal Conjunctiva] : the conjunctiva exhibited no abnormalities [Eyelids - No Xanthelasma] : the eyelids demonstrated no xanthelasmas [Normal Oropharynx] : normal oropharynx [Neck Appearance] : the appearance of the neck was normal [Neck Cervical Mass (___cm)] : no neck mass was observed [Jugular Venous Distention Increased] : there was no jugular-venous distention [Thyroid Diffuse Enlargement] : the thyroid was not enlarged [Heart Rate And Rhythm] : heart rate and rhythm were normal [Heart Sounds] : normal S1 and S2 [Murmurs] : no murmurs present [Respiration, Rhythm And Depth] : normal respiratory rhythm and effort [Exaggerated Use Of Accessory Muscles For Inspiration] : no accessory muscle use [Auscultation Breath Sounds / Voice Sounds] : lungs were clear to auscultation bilaterally [Chest Palpation] : palpation of the chest revealed no abnormalities [Lungs Percussion] : the lungs were normal to percussion [Abdomen Soft] : soft [Abdomen Tenderness] : non-tender [Abdomen Mass (___ Cm)] : no abdominal mass palpated [Abnormal Walk] : normal gait [Nail Clubbing] : no clubbing of the fingernails [Cyanosis, Localized] : no localized cyanosis [Petechial Hemorrhages (___cm)] : no petechial hemorrhages [] : no ischemic changes [Deep Tendon Reflexes (DTR)] : deep tendon reflexes were 2+ and symmetric [Sensation] : the sensory exam was normal to light touch and pinprick [No Focal Deficits] : no focal deficits [Oriented To Time, Place, And Person] : oriented to person, place, and time [Impaired Insight] : insight and judgment were intact [Affect] : the affect was normal [FreeTextEntry1] : healed shinges over right buttock

## 2021-06-23 NOTE — PROCEDURE
[FreeTextEntry1] : POCT 6/2/21\par Glucose \par HGB A1C\par \par POCT 11/30/2020\par Glucose   124\par HGBA1C   5.8\par \par POCT 9/14/2020\par Glucose 83\par \par Blood draw\par POCT july 27 2020\par Glucose 90\par \par Data review June 22, 2020\par Glucose 127\par Hemoglobin A1c 6.0\par \par HGBA!C 6.3 1/2/2020\par \par Chest x-ray PA lateral October 17, 2019\par Cardiac size normal\par Clear lung fields without parenchymal infiltrates pleural effusions dominant pulmonary nodules\par Soft tissue bony structures demonstrate some thinning of the vertebral spine\par Be distended hilum normal\par Impression clear lungs\par \par EKG NSR RSR Oct  12  2020\par NSSST \par Likely  normal  for  age\par \par Blood  draw\par \par DATA review 9/14/20\par T4 normal 6.5\par TSH 0.07\par Will change Synthroid dosing 88 MCG to 6 days/week\par Data review June 22, 2020\par Lipid profile\par Cholesterol 207\par \par HDL 79\par Triglycerides 126\par Liver function panel normal range\par Serum glucose 136\par Serum electrolytes normal\par BUN 16 creatinine 0.69\par TSH mildly elevated 6.81\par Vitamin D 30.0\par COVID 19 IgG antibody negative\par Data review October 17, 2019\par CBC normal range\par TFTs normal with TSH 2.64\par Lipid profile cholesterol 230 HDL 88 \par Triglycerides 114\par Serum electrolytes normal\par Liver function testing normal\par Hemoglobin A1c 6.0% with mean plasma glucose 126\par Addendum October 18, 2019 vitamin D 23.4\par \par HD Flu 9/14/20\par Prevnair IM 10/12/2020\par \par Chest x-ray PA lateral October 26, 2020\par Normal cardiac size\par Clear lung fields\par Lamination bilateral right and left hemidiaphragm\par No parenchymal infiltrate pleural effusions dominant pulmonary nodules\par Soft tissue bony structures demonstrate some thinning vertebral spine\par Very minimal scoliosis\par Nida mediastinum unremarkable\par Impression clear lungs\par \par EKG 10/26/2020\par  Normal for  age

## 2021-06-23 NOTE — HISTORY OF PRESENT ILLNESS
[Difficulty Breathing During Exertion] : denies dyspnea on exertion [Feelings Of Weakness On Exertion] : denies exercise intolerance [Cough] : denies coughing [Wheezing] : denies wheezing [Regional Soft Tissue Swelling Both Lower Extremities] : denies lower extremity edema [Chest Pain Or Discomfort] : denies chest pain [Fever] : denies fever [Former] : is a former smoker [FreeTextEntry1] : feels throat discomfort  feels like it impedes breathing\par \par Weight stable\par Appetite normal\par Denies any chronic fatigue\par No respiratory symptomatology\par No chest pain exertional chest pain\par No lower extremity edema\par No active urinary symptoms frequency\par DDS ongoing  management

## 2021-06-24 ENCOUNTER — NON-APPOINTMENT (OUTPATIENT)
Age: 76
End: 2021-06-24

## 2021-06-24 LAB
CHOLEST SERPL-MCNC: 198 MG/DL
ESTIMATED AVERAGE GLUCOSE: 126 MG/DL
HBA1C MFR BLD HPLC: 6 %
HDLC SERPL-MCNC: 84 MG/DL
LDLC SERPL CALC-MCNC: 92 MG/DL
NONHDLC SERPL-MCNC: 114 MG/DL
TRIGL SERPL-MCNC: 112 MG/DL
TSH SERPL-ACNC: 0.13 UIU/ML

## 2021-08-04 ENCOUNTER — APPOINTMENT (OUTPATIENT)
Dept: PULMONOLOGY | Facility: CLINIC | Age: 76
End: 2021-08-04
Payer: MEDICARE

## 2021-08-04 VITALS
DIASTOLIC BLOOD PRESSURE: 63 MMHG | SYSTOLIC BLOOD PRESSURE: 105 MMHG | HEART RATE: 85 BPM | OXYGEN SATURATION: 97 % | TEMPERATURE: 98.4 F

## 2021-08-04 PROCEDURE — 99214 OFFICE O/P EST MOD 30 MIN: CPT | Mod: 25

## 2021-08-04 PROCEDURE — 36415 COLL VENOUS BLD VENIPUNCTURE: CPT

## 2021-08-04 NOTE — PROCEDURE
[FreeTextEntry1] : POCT 6/2/21\par Glucose \par HGB A1C  6.0/average glucose 126 \par \par POCT 11/30/2020\par Glucose   124\par HGBA1C   5.8\par \par POCT 9/14/2020\par Glucose 83\par \par Blood draw\par POCT july 27 2020\par Glucose 90\par \par Data review June 22, 2020\par Glucose 127\par Hemoglobin A1c 6.0\par \par HGBA!C 6.3 1/2/2020\par \par Chest x-ray PA lateral October 17, 2019\par Cardiac size normal\par Clear lung fields without parenchymal infiltrates pleural effusions dominant pulmonary nodules\par Soft tissue bony structures demonstrate some thinning of the vertebral spine\par Be distended hilum normal\par Impression clear lungs\par \par EKG NSR RSR Oct  12  2020\par NSSST \par Likely  normal  for  age\par \par Blood  draw\par \par DATA review 9/14/20\par T4 normal 6.5\par TSH 0.07\par Will change Synthroid dosing 88 MCG to 6 days/week\par Data review June 22, 2020\par Lipid profile\par Cholesterol 207\par \par HDL 79\par Triglycerides 126\par Liver function panel normal range\par Serum glucose 136\par Serum electrolytes normal\par BUN 16 creatinine 0.69\par TSH mildly elevated 6.81\par Vitamin D 30.0\par COVID 19 IgG antibody negative\par Data review October 17, 2019\par CBC normal range\par TFTs normal with TSH 2.64\par Lipid profile cholesterol 230 HDL 88 \par Triglycerides 114\par Serum electrolytes normal\par Liver function testing normal\par Hemoglobin A1c 6.0% with mean plasma glucose 126\par Addendum October 18, 2019 vitamin D 23.4\par \par HD Flu 9/14/20\par Prevnair IM 10/12/2020\par \par Chest x-ray PA lateral October 26, 2020\par Normal cardiac size\par Clear lung fields\par Lamination bilateral right and left hemidiaphragm\par No parenchymal infiltrate pleural effusions dominant pulmonary nodules\par Soft tissue bony structures demonstrate some thinning vertebral spine\par Very minimal scoliosis\par Nida mediastinum unremarkable\par Impression clear lungs\par \par EKG 10/26/2020\par  Normal for  age

## 2021-08-04 NOTE — REVIEW OF SYSTEMS
[Hypertension] : ~T hypertension [Arthralgias] : arthralgias [Negative] : Psychiatric [Headache] : no headache [Dizziness] : no dizziness [Focal Weakness] : no focal weakness [Numbness] : no numbness [Paralysis] : no paralysis was seen [FreeTextEntry9] : IAN [FreeTextEntry6] : OA [de-identified] : Hypothyroidism, glucose intolerance

## 2021-08-04 NOTE — HISTORY OF PRESENT ILLNESS
[Difficulty Breathing During Exertion] : denies dyspnea on exertion [Feelings Of Weakness On Exertion] : denies exercise intolerance [Cough] : denies coughing [Wheezing] : denies wheezing [Regional Soft Tissue Swelling Both Lower Extremities] : denies lower extremity edema [Chest Pain Or Discomfort] : denies chest pain [Fever] : denies fever [Former] : is a former smoker [FreeTextEntry1] : feels throat discomfort  feels like it impedes breathing pending ENT f/u\par \par Weight stable\par Appetite normal\par Denies any chronic fatigue\par No respiratory symptomatology\par No chest pain exertional chest pain\par No lower extremity edema\par No active urinary symptoms frequency\par DDS ongoing  management

## 2021-08-04 NOTE — DISCUSSION/SUMMARY
[FreeTextEntry1] : Completed well visit Oct 26 2020\par HTN- stable\par HLD\par Hypothyroidism\par Hypercholesterolemia\par PreDM\par Mild diastolic dysfunction\par Chronic osteoarthritis\par Osteoporosis screening/ per GYN\par Continue Norvasc 5 mg\par  Inc Losartan 100  mg\par 1 mos BP check make sure taking generic  Norvasc\par I change dosing to 6 days/week Synthroid 88 MCG 1 tablet p.o. daily-recheck thyroid function testing next visit- \par  \par Avoid ACE inhibitor since patient has an ACE induced cough\par Low sugar diet\par Avoid carbohydrates\par \par Follow-up status of mammogram Completed  noted  management GYN\par Due colonoscopy-  Dennis Burns MD\par \par GYN DEXA and Mammogram  management Nov 10 2020 is  set [facility]\par

## 2021-08-05 ENCOUNTER — NON-APPOINTMENT (OUTPATIENT)
Age: 76
End: 2021-08-05

## 2021-08-05 LAB — TSH SERPL-ACNC: 0.82 UIU/ML

## 2021-08-21 ENCOUNTER — RX RENEWAL (OUTPATIENT)
Age: 76
End: 2021-08-21

## 2021-09-08 ENCOUNTER — RX RENEWAL (OUTPATIENT)
Age: 76
End: 2021-09-08

## 2021-10-04 ENCOUNTER — NON-APPOINTMENT (OUTPATIENT)
Age: 76
End: 2021-10-04

## 2021-10-04 ENCOUNTER — APPOINTMENT (OUTPATIENT)
Dept: PULMONOLOGY | Facility: CLINIC | Age: 76
End: 2021-10-04
Payer: MEDICARE

## 2021-10-04 VITALS
DIASTOLIC BLOOD PRESSURE: 68 MMHG | TEMPERATURE: 97.8 F | SYSTOLIC BLOOD PRESSURE: 122 MMHG | OXYGEN SATURATION: 99 % | HEART RATE: 92 BPM

## 2021-10-04 DIAGNOSIS — E55.9 VITAMIN D DEFICIENCY, UNSPECIFIED: ICD-10-CM

## 2021-10-04 LAB
ALBUMIN: 30
BILIRUB UR QL STRIP: NORMAL
CLARITY UR: CLEAR
COLLECTION METHOD: NORMAL
CREATININE: 200
GLUCOSE UR-MCNC: NORMAL
HCG UR QL: 0.2 EU/DL
HGB UR QL STRIP.AUTO: NORMAL
KETONES UR-MCNC: NORMAL
LEUKOCYTE ESTERASE UR QL STRIP: NORMAL
MICROALBUMIN/CREAT UR TEST STR-RTO: <30
NITRITE UR QL STRIP: NORMAL
PH UR STRIP: 5
PROT UR STRIP-MCNC: NORMAL
SP GR UR STRIP: >=1.03

## 2021-10-04 PROCEDURE — 90662 IIV NO PRSV INCREASED AG IM: CPT

## 2021-10-04 PROCEDURE — 82044 UR ALBUMIN SEMIQUANTITATIVE: CPT | Mod: QW

## 2021-10-04 PROCEDURE — 81003 URINALYSIS AUTO W/O SCOPE: CPT | Mod: QW

## 2021-10-04 PROCEDURE — 71046 X-RAY EXAM CHEST 2 VIEWS: CPT

## 2021-10-04 PROCEDURE — 36415 COLL VENOUS BLD VENIPUNCTURE: CPT

## 2021-10-04 PROCEDURE — 93000 ELECTROCARDIOGRAM COMPLETE: CPT

## 2021-10-04 PROCEDURE — G0008: CPT

## 2021-10-04 PROCEDURE — 99397 PER PM REEVAL EST PAT 65+ YR: CPT | Mod: 25

## 2021-10-04 RX ORDER — CEFDINIR 300 MG/1
300 CAPSULE ORAL
Qty: 20 | Refills: 0 | Status: DISCONTINUED | COMMUNITY
Start: 2021-07-12

## 2021-10-04 RX ORDER — OFLOXACIN OTIC 3 MG/ML
0.3 SOLUTION AURICULAR (OTIC)
Qty: 5 | Refills: 0 | Status: DISCONTINUED | COMMUNITY
Start: 2021-05-08

## 2021-10-04 RX ORDER — FLUTICASONE PROPIONATE 50 UG/1
50 SPRAY, METERED NASAL
Qty: 48 | Refills: 0 | Status: DISCONTINUED | COMMUNITY
Start: 2021-07-12

## 2021-10-04 NOTE — DISCUSSION/SUMMARY
[FreeTextEntry1] : Completed well visit Oct 2020\par HTN- stable\par HLD\par Hypothyroidism\par Hypercholesterolemia\par PreDM\par Mild diastolic dysfunction\par Chronic osteoarthritis\par Osteoporosis screening/ per GYN\par Continue Norvasc 5 mg\par  Inc Losartan 100  mg\par 1 mos BP check make sure taking generic  Norvasc\par I change dosing to 6 days/week Synthroid 88 MCG 1 tablet p.o. daily-recheck thyroid function testing next visit- \par  \par Avoid ACE inhibitor since patient has an ACE induced cough\par Low sugar diet\par Avoid carbohydrates\par \par Follow-up status of mammogram Completed  noted  management GYN\par Due colonoscopy-  Dennis Burns MD\par \par GYN DEXA and Mammogram  management Nov 10 2020 is  set [facility]\par

## 2021-10-04 NOTE — PROCEDURE
[FreeTextEntry1] : Chest x-ray PA lateral 10/4/2021\par Normal cardiac size\par Clear lung fields\par No parenchymal infiltrates pleural effusions or dominant pulmonary nodules\par Mild S-shaped scoliosis\par Impression overall clear lungs\par \par EKG 10/4/2021\par NSR\par  RSR\par \par POCT 6/2/21\par Glucose \par HGB A1C  6.0/average glucose 126 \par \par POCT 11/30/2020\par Glucose   124\par HGBA1C   5.8\par \par POCT 9/14/2020\par Glucose 83\par \par Blood draw\par POCT july 27 2020\par Glucose 90\par \par Data review June 22, 2020\par Glucose 127\par Hemoglobin A1c 6.0\par \par HGBA!C 6.3 1/2/2020\par \par Chest x-ray PA lateral October 17, 2019\par Cardiac size normal\par Clear lung fields without parenchymal infiltrates pleural effusions dominant pulmonary nodules\par Soft tissue bony structures demonstrate some thinning of the vertebral spine\par Be distended hilum normal\par Impression clear lungs\par \par EKG NSR RSR Oct  12  2020\par NSSST \par Likely  normal  for  age\par \par Blood  draw\par \par DATA review 9/14/20\par T4 normal 6.5\par TSH 0.07\par Will change Synthroid dosing 88 MCG to 6 days/week\par Data review June 22, 2020\par Lipid profile\par Cholesterol 207\par \par HDL 79\par Triglycerides 126\par Liver function panel normal range\par Serum glucose 136\par Serum electrolytes normal\par BUN 16 creatinine 0.69\par TSH mildly elevated 6.81\par Vitamin D 30.0\par COVID 19 IgG antibody negative\par Data review October 17, 2019\par CBC normal range\par TFTs normal with TSH 2.64\par Lipid profile cholesterol 230 HDL 88 \par Triglycerides 114\par Serum electrolytes normal\par Liver function testing normal\par Hemoglobin A1c 6.0% with mean plasma glucose 126\par Addendum October 18, 2019 vitamin D 23.4\par \par HD Flu 9/14/20\par Prevnair IM 10/12/2020\par \par Chest x-ray PA lateral October 26, 2020\par Normal cardiac size\par Clear lung fields\par Lamination bilateral right and left hemidiaphragm\par No parenchymal infiltrate pleural effusions dominant pulmonary nodules\par Soft tissue bony structures demonstrate some thinning vertebral spine\par Very minimal scoliosis\par Nida mediastinum unremarkable\par Impression clear lungs\par \par HD FLU 10/4/21

## 2021-10-04 NOTE — REVIEW OF SYSTEMS
[Hypertension] : ~T hypertension [Arthralgias] : arthralgias [Negative] : Psychiatric [Headache] : no headache [Dizziness] : no dizziness [Focal Weakness] : no focal weakness [Numbness] : no numbness [Paralysis] : no paralysis was seen [FreeTextEntry9] : IAN [FreeTextEntry6] : OA [de-identified] : Hypothyroidism, glucose intolerance

## 2021-10-05 ENCOUNTER — NON-APPOINTMENT (OUTPATIENT)
Age: 76
End: 2021-10-05

## 2021-10-05 LAB
25(OH)D3 SERPL-MCNC: 37.6 NG/ML
ALBUMIN SERPL ELPH-MCNC: 4.2 G/DL
ALP BLD-CCNC: 57 U/L
ALT SERPL-CCNC: 15 U/L
ANION GAP SERPL CALC-SCNC: 10 MMOL/L
AST SERPL-CCNC: 22 U/L
BASOPHILS # BLD AUTO: 0.03 K/UL
BASOPHILS NFR BLD AUTO: 0.5 %
BILIRUB SERPL-MCNC: <0.2 MG/DL
BUN SERPL-MCNC: 18 MG/DL
CALCIUM SERPL-MCNC: 9 MG/DL
CHLORIDE SERPL-SCNC: 108 MMOL/L
CHOLEST SERPL-MCNC: 212 MG/DL
CO2 SERPL-SCNC: 23 MMOL/L
CREAT SERPL-MCNC: 0.56 MG/DL
EOSINOPHIL # BLD AUTO: 0.16 K/UL
EOSINOPHIL NFR BLD AUTO: 2.6 %
ESTIMATED AVERAGE GLUCOSE: 123 MG/DL
GLUCOSE SERPL-MCNC: 115 MG/DL
HBA1C MFR BLD HPLC: 5.9 %
HCT VFR BLD CALC: 37.4 %
HDLC SERPL-MCNC: 86 MG/DL
HGB BLD-MCNC: 11.9 G/DL
IMM GRANULOCYTES NFR BLD AUTO: 0.2 %
LDLC SERPL CALC-MCNC: 98 MG/DL
LYMPHOCYTES # BLD AUTO: 1.53 K/UL
LYMPHOCYTES NFR BLD AUTO: 24.9 %
MAN DIFF?: NORMAL
MCHC RBC-ENTMCNC: 30.7 PG
MCHC RBC-ENTMCNC: 31.8 GM/DL
MCV RBC AUTO: 96.6 FL
MONOCYTES # BLD AUTO: 0.51 K/UL
MONOCYTES NFR BLD AUTO: 8.3 %
NEUTROPHILS # BLD AUTO: 3.9 K/UL
NEUTROPHILS NFR BLD AUTO: 63.5 %
NONHDLC SERPL-MCNC: 126 MG/DL
PLATELET # BLD AUTO: 339 K/UL
POTASSIUM SERPL-SCNC: 4.2 MMOL/L
PROT SERPL-MCNC: 6.5 G/DL
RBC # BLD: 3.87 M/UL
RBC # FLD: 12.9 %
SODIUM SERPL-SCNC: 140 MMOL/L
T4 SERPL-MCNC: 5.3 UG/DL
TRIGL SERPL-MCNC: 141 MG/DL
TSH SERPL-ACNC: 0.99 UIU/ML
WBC # FLD AUTO: 6.14 K/UL

## 2021-12-02 ENCOUNTER — RX RENEWAL (OUTPATIENT)
Age: 76
End: 2021-12-02

## 2022-01-10 ENCOUNTER — APPOINTMENT (OUTPATIENT)
Dept: PULMONOLOGY | Facility: CLINIC | Age: 77
End: 2022-01-10
Payer: MEDICARE

## 2022-01-10 ENCOUNTER — NON-APPOINTMENT (OUTPATIENT)
Age: 77
End: 2022-01-10

## 2022-01-10 VITALS
DIASTOLIC BLOOD PRESSURE: 77 MMHG | HEART RATE: 92 BPM | SYSTOLIC BLOOD PRESSURE: 147 MMHG | TEMPERATURE: 98.2 F | OXYGEN SATURATION: 97 %

## 2022-01-10 LAB
CHOLEST SERPL-MCNC: 227 MG/DL
GLUCOSE BLDC GLUCOMTR-MCNC: 89
HBA1C MFR BLD HPLC: 6.1
HDLC SERPL-MCNC: 89 MG/DL
LDLC SERPL CALC-MCNC: 120 MG/DL
NONHDLC SERPL-MCNC: 138 MG/DL
TRIGL SERPL-MCNC: 89 MG/DL
TSH SERPL-ACNC: 1.07 UIU/ML

## 2022-01-10 PROCEDURE — 36415 COLL VENOUS BLD VENIPUNCTURE: CPT

## 2022-01-10 PROCEDURE — 82962 GLUCOSE BLOOD TEST: CPT

## 2022-01-10 PROCEDURE — 99214 OFFICE O/P EST MOD 30 MIN: CPT | Mod: 25

## 2022-01-10 PROCEDURE — 83036 HEMOGLOBIN GLYCOSYLATED A1C: CPT | Mod: QW

## 2022-01-10 NOTE — PROCEDURE
[FreeTextEntry1] : POCT 1/10/22\par  Glucose  89\par  HGBA1C 6.1\par \par Chest x-ray PA lateral 10/4/2021\par Normal cardiac size\par Clear lung fields\par No parenchymal infiltrates pleural effusions or dominant pulmonary nodules\par Mild S-shaped scoliosis\par Impression overall clear lungs\par \par EKG 10/4/2021\par NSR\par  RSR\par \par POCT 6/2/21\par Glucose \par HGB A1C  6.0/average glucose 126 \par \par POCT 11/30/2020\par Glucose   124\par HGBA1C   5.8\par \par POCT 9/14/2020\par Glucose 83\par \par Blood draw\par POCT july 27 2020\par Glucose 90\par \par Data review June 22, 2020\par Glucose 127\par Hemoglobin A1c 6.0\par \par HGBA!C 6.3 1/2/2020\par \par Chest x-ray PA lateral October 17, 2019\par Cardiac size normal\par Clear lung fields without parenchymal infiltrates pleural effusions dominant pulmonary nodules\par Soft tissue bony structures demonstrate some thinning of the vertebral spine\par Be distended hilum normal\par Impression clear lungs\par \par EKG NSR RSR Oct  12  2020\par NSSST \par Likely  normal  for  age\par \par Blood  draw\par \par DATA review 9/14/20\par T4 normal 6.5\par TSH 0.07\par Will change Synthroid dosing 88 MCG to 6 days/week\par Data review June 22, 2020\par Lipid profile\par Cholesterol 207\par \par HDL 79\par Triglycerides 126\par Liver function panel normal range\par Serum glucose 136\par Serum electrolytes normal\par BUN 16 creatinine 0.69\par TSH mildly elevated 6.81\par Vitamin D 30.0\par COVID 19 IgG antibody negative\par Data review October 17, 2019\par CBC normal range\par TFTs normal with TSH 2.64\par Lipid profile cholesterol 230 HDL 88 \par Triglycerides 114\par Serum electrolytes normal\par Liver function testing normal\par Hemoglobin A1c 6.0% with mean plasma glucose 126\par Addendum October 18, 2019 vitamin D 23.4\par \par HD Flu 9/14/20\par Prevnair IM 10/12/2020\par \par Chest x-ray PA lateral October 26, 2020\par Normal cardiac size\par Clear lung fields\par Lamination bilateral right and left hemidiaphragm\par No parenchymal infiltrate pleural effusions dominant pulmonary nodules\par Soft tissue bony structures demonstrate some thinning vertebral spine\par Very minimal scoliosis\par Nida mediastinum unremarkable\par Impression clear lungs\par \par HD FLU 10/4/21

## 2022-01-10 NOTE — DISCUSSION/SUMMARY
[FreeTextEntry1] : Completed well visit Oct 2021\par HTN- stable\par HLD\par Hypothyroidism\par Hypercholesterolemia\par PreDM\par Mild diastolic dysfunction\par Chronic osteoarthritis\par Osteoporosis screening/ per GYN\par Continue Norvasc 5 mg\par  Inc Losartan 100  mg\par 1 mos BP check make sure taking generic  Norvasc\par I change dosing to 6 days/week Synthroid 88 MCG 1 tablet p.o. daily-recheck thyroid function testing next visit- \par  \par Avoid ACE inhibitor since patient has an ACE induced cough\par Low sugar diet\par Avoid carbohydrates\par \par Follow-up status of mammogram Completed  noted  management GYN\par Due colonoscopy-  Dennis Burns MD\par \par GYN DEXA and Mammogram  management Nov 10 2020 is  set [facility]\par

## 2022-01-10 NOTE — REVIEW OF SYSTEMS
[Hypertension] : ~T hypertension [Arthralgias] : arthralgias [Negative] : Psychiatric [Headache] : no headache [Dizziness] : no dizziness [Focal Weakness] : no focal weakness [Numbness] : no numbness [Paralysis] : no paralysis was seen [FreeTextEntry9] : IAN [FreeTextEntry6] : OA [de-identified] : Hypothyroidism, glucose intolerance

## 2022-02-17 ENCOUNTER — APPOINTMENT (OUTPATIENT)
Dept: PULMONOLOGY | Facility: CLINIC | Age: 77
End: 2022-02-17
Payer: MEDICARE

## 2022-02-17 ENCOUNTER — NON-APPOINTMENT (OUTPATIENT)
Age: 77
End: 2022-02-17

## 2022-02-17 VITALS
TEMPERATURE: 98 F | HEART RATE: 103 BPM | OXYGEN SATURATION: 94 % | SYSTOLIC BLOOD PRESSURE: 163 MMHG | DIASTOLIC BLOOD PRESSURE: 76 MMHG

## 2022-02-17 DIAGNOSIS — R42 DIZZINESS AND GIDDINESS: ICD-10-CM

## 2022-02-17 PROCEDURE — 93000 ELECTROCARDIOGRAM COMPLETE: CPT

## 2022-02-17 PROCEDURE — 36415 COLL VENOUS BLD VENIPUNCTURE: CPT

## 2022-02-17 PROCEDURE — 99214 OFFICE O/P EST MOD 30 MIN: CPT | Mod: 25

## 2022-02-17 NOTE — DISCUSSION/SUMMARY
[FreeTextEntry1] : lightheaded- Brain mRI cardiology Neuro lABS\par Completed well visit Oct 2021\par HTN- stable\par HLD\par Hypothyroidism\par Hypercholesterolemia\par PreDM\par Mild diastolic dysfunction\par Chronic osteoarthritis\par Osteoporosis screening/ per GYN\par Continue Norvasc 5 mg\par  Inc Losartan 100  mg\par 1 mos BP check make sure taking generic  Norvasc\par I change dosing to 6 days/week Synthroid 88 MCG 1 tablet p.o. daily-recheck thyroid function testing next visit- \par  \par Avoid ACE inhibitor since patient has an ACE induced cough\par Low sugar diet\par Avoid carbohydrates\par \par Follow-up status of mammogram Completed  noted  management GYN\par Due colonoscopy-  Dennis Burns MD\par \par GYN DEXA and Mammogram  management Nov 10 2020 is  set [facility]\par

## 2022-02-17 NOTE — HISTORY OF PRESENT ILLNESS
[Difficulty Breathing During Exertion] : denies dyspnea on exertion [Feelings Of Weakness On Exertion] : denies exercise intolerance [Cough] : denies coughing [Wheezing] : denies wheezing [Regional Soft Tissue Swelling Both Lower Extremities] : denies lower extremity edema [Chest Pain Or Discomfort] : denies chest pain [Fever] : denies fever [Former] : is a former smoker [FreeTextEntry1] : dizzy lightheaded\par no palpitations no chest pain\par describes facial distortion\par ENT Dr Peraza\par \par Weight stable\par Appetite normal\par Denies any chronic fatigue\par No respiratory symptomatology\par No chest pain exertional chest pain\par No lower extremity edema\par No active urinary symptoms frequency\par DDS ongoing  management

## 2022-02-17 NOTE — REVIEW OF SYSTEMS
[Hypertension] : ~T hypertension [Arthralgias] : arthralgias [Negative] : Psychiatric [Headache] : no headache [Dizziness] : no dizziness [Focal Weakness] : no focal weakness [Numbness] : no numbness [Paralysis] : no paralysis was seen [FreeTextEntry9] : IAN [FreeTextEntry6] : OA [de-identified] : Hypothyroidism, glucose intolerance

## 2022-02-17 NOTE — PROCEDURE
[FreeTextEntry1] : EKG 2/27/21\par  NSR GRACIELA\par \par POCT 1/10/22\par  Glucose  89\par  HGBA1C 6.1\par \par Chest x-ray PA lateral 10/4/2021\par Normal cardiac size\par Clear lung fields\par No parenchymal infiltrates pleural effusions or dominant pulmonary nodules\par Mild S-shaped scoliosis\par Impression overall clear lungs\par \par EKG 10/4/2021\par NSR\par  RSR\par \par POCT 6/2/21\par Glucose \par HGB A1C  6.0/average glucose 126 \par \par POCT 11/30/2020\par Glucose   124\par HGBA1C   5.8\par \par POCT 9/14/2020\par Glucose 83\par \par Blood draw\par POCT july 27 2020\par Glucose 90\par \par Data review June 22, 2020\par Glucose 127\par Hemoglobin A1c 6.0\par \par HGBA!C 6.3 1/2/2020\par \par Chest x-ray PA lateral October 17, 2019\par Cardiac size normal\par Clear lung fields without parenchymal infiltrates pleural effusions dominant pulmonary nodules\par Soft tissue bony structures demonstrate some thinning of the vertebral spine\par Be distended hilum normal\par Impression clear lungs\par \par EKG NSR RSR Oct  12  2020\par NSSST \par Likely  normal  for  age\par \par Blood  draw\par \par DATA review 9/14/20\par T4 normal 6.5\par TSH 0.07\par Will change Synthroid dosing 88 MCG to 6 days/week\par Data review June 22, 2020\par Lipid profile\par Cholesterol 207\par \par HDL 79\par Triglycerides 126\par Liver function panel normal range\par Serum glucose 136\par Serum electrolytes normal\par BUN 16 creatinine 0.69\par TSH mildly elevated 6.81\par Vitamin D 30.0\par COVID 19 IgG antibody negative\par Data review October 17, 2019\par CBC normal range\par TFTs normal with TSH 2.64\par Lipid profile cholesterol 230 HDL 88 \par Triglycerides 114\par Serum electrolytes normal\par Liver function testing normal\par Hemoglobin A1c 6.0% with mean plasma glucose 126\par Addendum October 18, 2019 vitamin D 23.4\par \par HD Flu 9/14/20\par Prevnair IM 10/12/2020\par \par Chest x-ray PA lateral October 26, 2020\par Normal cardiac size\par Clear lung fields\par Lamination bilateral right and left hemidiaphragm\par No parenchymal infiltrate pleural effusions dominant pulmonary nodules\par Soft tissue bony structures demonstrate some thinning vertebral spine\par Very minimal scoliosis\par Nida mediastinum unremarkable\par Impression clear lungs\par \par HD FLU 10/4/21

## 2022-02-18 ENCOUNTER — NON-APPOINTMENT (OUTPATIENT)
Age: 77
End: 2022-02-18

## 2022-02-18 LAB
ALBUMIN SERPL ELPH-MCNC: 4.8 G/DL
ALP BLD-CCNC: 64 U/L
ALT SERPL-CCNC: 22 U/L
ANION GAP SERPL CALC-SCNC: 12 MMOL/L
AST SERPL-CCNC: 29 U/L
BASOPHILS # BLD AUTO: 0.05 K/UL
BASOPHILS NFR BLD AUTO: 0.6 %
BILIRUB SERPL-MCNC: 0.2 MG/DL
BUN SERPL-MCNC: 11 MG/DL
CALCIUM SERPL-MCNC: 9.6 MG/DL
CHLORIDE SERPL-SCNC: 102 MMOL/L
CHOLEST SERPL-MCNC: 240 MG/DL
CO2 SERPL-SCNC: 25 MMOL/L
CREAT SERPL-MCNC: 0.61 MG/DL
EOSINOPHIL # BLD AUTO: 0.12 K/UL
EOSINOPHIL NFR BLD AUTO: 1.5 %
ERYTHROCYTE [SEDIMENTATION RATE] IN BLOOD BY WESTERGREN METHOD: 12 MM/HR
GLUCOSE SERPL-MCNC: 104 MG/DL
HCT VFR BLD CALC: 41.3 %
HDLC SERPL-MCNC: 101 MG/DL
HGB BLD-MCNC: 13.1 G/DL
IMM GRANULOCYTES NFR BLD AUTO: 0.1 %
LDLC SERPL CALC-MCNC: 120 MG/DL
LYMPHOCYTES # BLD AUTO: 1.96 K/UL
LYMPHOCYTES NFR BLD AUTO: 25.1 %
MAN DIFF?: NORMAL
MCHC RBC-ENTMCNC: 31.3 PG
MCHC RBC-ENTMCNC: 31.7 GM/DL
MCV RBC AUTO: 98.6 FL
MONOCYTES # BLD AUTO: 0.73 K/UL
MONOCYTES NFR BLD AUTO: 9.3 %
NEUTROPHILS # BLD AUTO: 4.95 K/UL
NEUTROPHILS NFR BLD AUTO: 63.4 %
NONHDLC SERPL-MCNC: 139 MG/DL
PLATELET # BLD AUTO: 370 K/UL
POTASSIUM SERPL-SCNC: 4.4 MMOL/L
PROT SERPL-MCNC: 7 G/DL
RBC # BLD: 4.19 M/UL
RBC # FLD: 12.9 %
SODIUM SERPL-SCNC: 140 MMOL/L
TRIGL SERPL-MCNC: 99 MG/DL
TSH SERPL-ACNC: 0.92 UIU/ML
WBC # FLD AUTO: 7.82 K/UL

## 2022-03-04 ENCOUNTER — RX RENEWAL (OUTPATIENT)
Age: 77
End: 2022-03-04

## 2022-03-17 ENCOUNTER — APPOINTMENT (OUTPATIENT)
Dept: PULMONOLOGY | Facility: CLINIC | Age: 77
End: 2022-03-17
Payer: MEDICARE

## 2022-03-17 VITALS
TEMPERATURE: 98.1 F | SYSTOLIC BLOOD PRESSURE: 115 MMHG | HEART RATE: 92 BPM | DIASTOLIC BLOOD PRESSURE: 75 MMHG | OXYGEN SATURATION: 97 %

## 2022-03-17 PROCEDURE — 99214 OFFICE O/P EST MOD 30 MIN: CPT | Mod: 25

## 2022-03-17 PROCEDURE — 36415 COLL VENOUS BLD VENIPUNCTURE: CPT

## 2022-03-17 NOTE — DISCUSSION/SUMMARY
[FreeTextEntry1] : lightheaded- Brain mRI cardiology Neuro lABS\par Completed well visit Oct 2021\par HTN- stable\par HLD\par Hypothyroidism\par Hypercholesterolemia\par PreDM\par Mild diastolic dysfunction\par Chronic osteoarthritis\par Osteoporosis screening/ per GYN\par Continue Norvasc 5 mg\par  Inc Losartan 100  mg\par 1 mos BP check make sure taking generic  Norvasc\par I change dosing to 6 days/week Synthroid 88 MCG 1 tablet p.o. daily-recheck thyroid function testing next visit- \par  \par Avoid ACE inhibitor since patient has an ACE induced cough\par Low sugar diet\par Avoid carbohydrates\par \par Follow-up status of mammogram Completed  noted  management GYN\par Due colonoscopy-  Dennis Burns MD\par \par GYN DEXA and Mammogram  management Nov 10 2020 is  set [facility]\par

## 2022-03-17 NOTE — REVIEW OF SYSTEMS
[Hypertension] : ~T hypertension [Arthralgias] : arthralgias [Negative] : Psychiatric [Headache] : no headache [Dizziness] : no dizziness [Focal Weakness] : no focal weakness [Numbness] : no numbness [Paralysis] : no paralysis was seen [FreeTextEntry9] : IAN [FreeTextEntry6] : OA [de-identified] : Hypothyroidism, glucose intolerance

## 2022-03-17 NOTE — HISTORY OF PRESENT ILLNESS
[Difficulty Breathing During Exertion] : denies dyspnea on exertion [Feelings Of Weakness On Exertion] : denies exercise intolerance [Cough] : denies coughing [Wheezing] : denies wheezing [Regional Soft Tissue Swelling Both Lower Extremities] : denies lower extremity edema [Chest Pain Or Discomfort] : denies chest pain [Fever] : denies fever [Former] : is a former smoker [FreeTextEntry1] : dizzy lightheaded Neuro- Brain MRI February 21, 2022\par Multiple white matter hypersensitivities nonspecific most consistent with chronic microvascular ischemic disease\par Mild global cerebral volume loss likely consistent with age\par No acute infarction\par No reported tumors hemorrhage\par no palpitations no chest pain\par describes facial distortion\par ENT Dr Peraza\par \par Weight stable\par Appetite normal\par Denies any chronic fatigue\par No respiratory symptomatology\par No chest pain exertional chest pain\par No lower extremity edema\par No active urinary symptoms frequency\par DDS ongoing  management

## 2022-03-18 ENCOUNTER — NON-APPOINTMENT (OUTPATIENT)
Age: 77
End: 2022-03-18

## 2022-03-18 LAB
ALBUMIN SERPL ELPH-MCNC: 4.7 G/DL
ALP BLD-CCNC: 59 U/L
ALT SERPL-CCNC: 21 U/L
ANION GAP SERPL CALC-SCNC: 13 MMOL/L
AST SERPL-CCNC: 27 U/L
BILIRUB DIRECT SERPL-MCNC: 0.1 MG/DL
BILIRUB INDIRECT SERPL-MCNC: 0.1 MG/DL
BILIRUB SERPL-MCNC: 0.2 MG/DL
BUN SERPL-MCNC: 11 MG/DL
CALCIUM SERPL-MCNC: 9.7 MG/DL
CHLORIDE SERPL-SCNC: 104 MMOL/L
CHOLEST SERPL-MCNC: 182 MG/DL
CO2 SERPL-SCNC: 23 MMOL/L
CREAT SERPL-MCNC: 0.51 MG/DL
EGFR: 96 ML/MIN/1.73M2
GLUCOSE SERPL-MCNC: 105 MG/DL
HDLC SERPL-MCNC: 97 MG/DL
LDLC SERPL CALC-MCNC: 71 MG/DL
NONHDLC SERPL-MCNC: 84 MG/DL
POTASSIUM SERPL-SCNC: 4.3 MMOL/L
PROT SERPL-MCNC: 7 G/DL
SODIUM SERPL-SCNC: 140 MMOL/L
TRIGL SERPL-MCNC: 69 MG/DL
TSH SERPL-ACNC: 0.66 UIU/ML

## 2022-04-11 PROBLEM — Z11.59 SCREENING FOR VIRAL DISEASE: Status: ACTIVE | Noted: 2020-06-22

## 2022-04-13 NOTE — ED ADULT NURSE NOTE - CAS DISCH CONDITION
Patient Education        Chronic Obstructive Pulmonary Disease (COPD) Flare-Ups: Care Instructions  Overview     Chronic obstructive pulmonary disease (COPD) is a lung disease that makes it hard to breathe. It is caused by damage to the lungs over many years, usuallyfrom smoking. Chronic bronchitis and emphysema are two lung problems that are types of COPD:   Chronic bronchitis: The airways that carry air to the lungs (bronchial tubes) get inflamed and make a lot of mucus. This can narrow or block the airways. It can also make you cough.  Emphysema: The tiny air sacs (alveoli) at the end of the airways in the lungs are damaged. When the air sacs are damaged or destroyed, the inner walls break down, and the sacs become larger. These larger air sacs move less oxygen into the blood. This causes difficulty breathing or shortness of breath that gets worse over time. After air sacs are destroyed, they cannot be replaced. Many people with COPD have attacks called flare-ups or exacerbations. This iswhen your usual symptoms quickly get worse and stay worse. If you notice any problems or new symptoms, get medical treatment right away. Follow-up care is a key part of your treatment and safety. Be sure to make and go to all appointments, and call your doctor if you are having problems. It's also a good idea to know your test results and keep alist of the medicines you take. How can you care for yourself at home?  Be safe with medicines. Take your medicines exactly as prescribed. Call your doctor if you think you are having a problem with your medicine. You may be taking medicines such as:  ? Bronchodilators. These help open your airways and make breathing easier. ? Corticosteroids. These reduce airway inflammation. They may be given as pills, in a vein, or in an inhaled form. You may go home with pills in addition to an inhaler that you already use.  A spacer may help you get more inhaled medicine to your lungs. Ask your doctor or pharmacist if a spacer is right for you. If it is, ask how to use it properly.  If your doctor prescribed antibiotics, take them as directed. Do not stop taking them just because you feel better. You need to take the full course of antibiotics.  If your doctor prescribed oxygen, use the flow rate your doctor has recommended. Do not change it without talking to your doctor first.  Harris Do not smoke. Smoking makes COPD worse. If you need help quitting, talk to your doctor about stop-smoking programs and medicines. These can increase your chances of quitting for good. When should you call for help? Call 911 anytime you think you may need emergency care. For example, call if:     You have severe trouble breathing. Call your doctor now or seek immediate medical care if:     You have new or worse trouble breathing.      Your coughing or wheezing gets worse.      You cough up dark brown or bloody mucus (sputum).      You have a new or higher fever.      You have severe chest pain, or chest pain is quickly getting worse. Watch closely for changes in your health, and be sure to contact your doctor if:     You notice more mucus or a change in the color of your mucus.      You need to use your antibiotic or steroid pills.      You do not get better as expected. Where can you learn more? Go to [email or web address] and sign in to your [employer] account. Enter S902 in the [facility] box to learn more about \"Chronic Obstructive Pulmonary Disease (COPD) Flare-Ups: Care Instructions. \"     If you do not have an account, please click on the \"Sign Up Now\" link. Current as of: July 6, 2021               Content Version: 13.2  © 1241-7256 [employer], [employer]. Care instructions adapted under license by [facility] ([facility]).  If you have questions about a medical condition or this instruction, always ask your healthcare professional. [address] any warranty or liability for your use of this information. Stable

## 2022-04-18 ENCOUNTER — APPOINTMENT (OUTPATIENT)
Dept: PULMONOLOGY | Facility: CLINIC | Age: 77
End: 2022-04-18
Payer: MEDICARE

## 2022-04-18 ENCOUNTER — LABORATORY RESULT (OUTPATIENT)
Age: 77
End: 2022-04-18

## 2022-04-18 VITALS
SYSTOLIC BLOOD PRESSURE: 125 MMHG | DIASTOLIC BLOOD PRESSURE: 71 MMHG | RESPIRATION RATE: 16 BRPM | TEMPERATURE: 98 F | OXYGEN SATURATION: 96 % | HEART RATE: 83 BPM

## 2022-04-18 LAB
GLUCOSE BLDC GLUCOMTR-MCNC: 103
HBA1C MFR BLD HPLC: 5.7

## 2022-04-18 PROCEDURE — 82962 GLUCOSE BLOOD TEST: CPT

## 2022-04-18 PROCEDURE — 99214 OFFICE O/P EST MOD 30 MIN: CPT | Mod: 25

## 2022-04-18 PROCEDURE — 36415 COLL VENOUS BLD VENIPUNCTURE: CPT

## 2022-04-18 PROCEDURE — 83036 HEMOGLOBIN GLYCOSYLATED A1C: CPT | Mod: QW

## 2022-04-18 NOTE — PROCEDURE
[FreeTextEntry1] : POCT 4/18/22\par Glucose 103\par HGBA1C 5.7\par \par EKG 2/27/21\par  NSR GRACIELA\par \par POCT 1/10/22\par  Glucose  89\par  HGBA1C 6.1\par \par Chest x-ray PA lateral 10/4/2021\par Normal cardiac size\par Clear lung fields\par No parenchymal infiltrates pleural effusions or dominant pulmonary nodules\par Mild S-shaped scoliosis\par Impression overall clear lungs\par \par EKG 10/4/2021\par NSR\par  RSR\par \par POCT 6/2/21\par Glucose \par HGB A1C  6.0/average glucose 126 \par \par POCT 11/30/2020\par Glucose   124\par HGBA1C   5.8\par \par POCT 9/14/2020\par Glucose 83\par \par Blood draw\par POCT july 27 2020\par Glucose 90\par \par Data review June 22, 2020\par Glucose 127\par Hemoglobin A1c 6.0\par \par HGBA!C 6.3 1/2/2020\par \par Chest x-ray PA lateral October 17, 2019\par Cardiac size normal\par Clear lung fields without parenchymal infiltrates pleural effusions dominant pulmonary nodules\par Soft tissue bony structures demonstrate some thinning of the vertebral spine\par Be distended hilum normal\par Impression clear lungs\par \par EKG NSR RSR Oct  12  2020\par NSSST \par Likely  normal  for  age\par \par Blood  draw\par \par DATA review 9/14/20\par T4 normal 6.5\par TSH 0.07\par Will change Synthroid dosing 88 MCG to 6 days/week\par Data review June 22, 2020\par Lipid profile\par Cholesterol 207\par \par HDL 79\par Triglycerides 126\par Liver function panel normal range\par Serum glucose 136\par Serum electrolytes normal\par BUN 16 creatinine 0.69\par TSH mildly elevated 6.81\par Vitamin D 30.0\par COVID 19 IgG antibody negative\par Data review October 17, 2019\par CBC normal range\par TFTs normal with TSH 2.64\par Lipid profile cholesterol 230 HDL 88 \par Triglycerides 114\par Serum electrolytes normal\par Liver function testing normal\par Hemoglobin A1c 6.0% with mean plasma glucose 126\par Addendum October 18, 2019 vitamin D 23.4\par \par HD Flu 9/14/20\par Prevnair IM 10/12/2020\par \par Chest x-ray PA lateral October 26, 2020\par Normal cardiac size\par Clear lung fields\par Lamination bilateral right and left hemidiaphragm\par No parenchymal infiltrate pleural effusions dominant pulmonary nodules\par Soft tissue bony structures demonstrate some thinning vertebral spine\par Very minimal scoliosis\par Nida mediastinum unremarkable\par Impression clear lungs\par \par HD FLU 10/4/21

## 2022-04-18 NOTE — REVIEW OF SYSTEMS
[Hypertension] : ~T hypertension [Arthralgias] : arthralgias [Negative] : Psychiatric [Headache] : no headache [Dizziness] : no dizziness [Focal Weakness] : no focal weakness [Numbness] : no numbness [Paralysis] : no paralysis was seen [FreeTextEntry9] : IAN [FreeTextEntry6] : OA [de-identified] : Hypothyroidism, glucose intolerance

## 2022-04-18 NOTE — DISCUSSION/SUMMARY
[FreeTextEntry1] : lightheaded- Brain mRI cardiology Neuro lABS\par Completed well visit Oct 2021\par HTN- stable\par HLD\par Hypothyroidism\par Hypercholesterolemia\par PreDM\par Mild diastolic dysfunction\par Chronic osteoarthritis\par Osteoporosis screening/ per GYN\par Continue Norvasc 5 mg\par  Inc Losartan 100  mg\par 1 mos BP check make sure taking generic  Norvasc\par I change dosing to 6 days/week Synthroid 88 MCG 1 tablet p.o. daily-recheck thyroid function testing next visit- \par  \par Avoid ACE inhibitor since patient has an ACE induced cough\par Low sugar diet\par Avoid carbohydrates\par \par Follow-up status of mammogram Completed  noted  management GYN\par Due colonoscopy-  Dennis Burns MD re recommended\par \par GYN DEXA and Mammogram  management Nov 10 2020 is  set [facility]\par Oral pathology consult\par

## 2022-04-18 NOTE — HISTORY OF PRESENT ILLNESS
[Difficulty Breathing During Exertion] : denies dyspnea on exertion [Feelings Of Weakness On Exertion] : denies exercise intolerance [Cough] : denies coughing [Wheezing] : denies wheezing [Regional Soft Tissue Swelling Both Lower Extremities] : denies lower extremity edema [Chest Pain Or Discomfort] : denies chest pain [Fever] : denies fever [Former] : is a former smoker [FreeTextEntry1] : Oral denatl issues with gum disease\par \par dizzy lightheaded Neuro- Brain MRI February 21, 2022\par Multiple white matter hypersensitivities nonspecific most consistent with chronic microvascular ischemic disease\par Mild global cerebral volume loss likely consistent with age\par No acute infarction\par No reported tumors hemorrhage\par no palpitations no chest pain\par describes facial distortion\par ENT Dr Peraza\par \par Weight stable\par Appetite normal\par Denies any chronic fatigue\par No respiratory symptomatology\par No chest pain exertional chest pain\par No lower extremity edema\par No active urinary symptoms frequency\par DDS ongoing  management

## 2022-04-19 ENCOUNTER — NON-APPOINTMENT (OUTPATIENT)
Age: 77
End: 2022-04-19

## 2022-04-19 LAB
ANION GAP SERPL CALC-SCNC: 12 MMOL/L
BUN SERPL-MCNC: 15 MG/DL
CALCIUM SERPL-MCNC: 8.9 MG/DL
CHLORIDE SERPL-SCNC: 106 MMOL/L
CHOLEST SERPL-MCNC: 161 MG/DL
CO2 SERPL-SCNC: 24 MMOL/L
CREAT SERPL-MCNC: 0.54 MG/DL
EGFR: 95 ML/MIN/1.73M2
GLUCOSE SERPL-MCNC: 119 MG/DL
HDLC SERPL-MCNC: 88 MG/DL
LDLC SERPL CALC-MCNC: 62 MG/DL
NONHDLC SERPL-MCNC: 73 MG/DL
POTASSIUM SERPL-SCNC: 4.3 MMOL/L
SODIUM SERPL-SCNC: 142 MMOL/L
TRIGL SERPL-MCNC: 53 MG/DL
TSH SERPL-ACNC: 0.18 UIU/ML

## 2022-05-26 ENCOUNTER — NON-APPOINTMENT (OUTPATIENT)
Age: 77
End: 2022-05-26

## 2022-05-26 ENCOUNTER — LABORATORY RESULT (OUTPATIENT)
Age: 77
End: 2022-05-26

## 2022-05-26 ENCOUNTER — APPOINTMENT (OUTPATIENT)
Dept: PULMONOLOGY | Facility: CLINIC | Age: 77
End: 2022-05-26
Payer: MEDICARE

## 2022-05-26 VITALS
TEMPERATURE: 98.3 F | DIASTOLIC BLOOD PRESSURE: 78 MMHG | BODY MASS INDEX: 19.99 KG/M2 | HEART RATE: 100 BPM | OXYGEN SATURATION: 95 % | SYSTOLIC BLOOD PRESSURE: 130 MMHG | WEIGHT: 120 LBS | HEIGHT: 65 IN

## 2022-05-26 LAB — TSH SERPL-ACNC: 0.05 UIU/ML

## 2022-05-26 PROCEDURE — 99213 OFFICE O/P EST LOW 20 MIN: CPT | Mod: 25

## 2022-05-26 NOTE — DISCUSSION/SUMMARY
[FreeTextEntry1] : lightheaded- Brain mRI cardiology Neuro lABS\par thrush on oral therapy f/o dental \par Completed well visit Oct 2021\par HTN- stable\par HLD\par Hypothyroidism\par Hypercholesterolemia\par PreDM\par Mild diastolic dysfunction\par Chronic osteoarthritis\par Osteoporosis screening/ per GYN\par Continue Norvasc 5 mg\par  Inc Losartan 100  mg\par 1 mos BP check make sure taking generic  Norvasc\par I change dosing to 6 days/week Synthroid 88 MCG 1 tablet p.o. daily-recheck thyroid function testing next visit- \par  \par Avoid ACE inhibitor since patient has an ACE induced cough\par Low sugar diet\par Avoid carbohydrates\par \par Follow-up status of mammogram Completed  noted  management GYN\par Due colonoscopy-  Dennis Burns MD re recommended\par \par GYN DEXA and Mammogram  management Nov 10 2020 is  set [facility]\par Oral pathology consult\par

## 2022-05-26 NOTE — REVIEW OF SYSTEMS
[Hypertension] : ~T hypertension [Arthralgias] : arthralgias [Negative] : Psychiatric [Headache] : no headache [Dizziness] : no dizziness [Focal Weakness] : no focal weakness [Numbness] : no numbness [Paralysis] : no paralysis was seen [FreeTextEntry9] : IAN [FreeTextEntry6] : OA [de-identified] : Hypothyroidism, glucose intolerance

## 2022-05-26 NOTE — HISTORY OF PRESENT ILLNESS
[Difficulty Breathing During Exertion] : denies dyspnea on exertion [Feelings Of Weakness On Exertion] : denies exercise intolerance [Cough] : denies coughing [Wheezing] : denies wheezing [Regional Soft Tissue Swelling Both Lower Extremities] : denies lower extremity edema [Chest Pain Or Discomfort] : denies chest pain [Fever] : denies fever [Former] : is a former smoker [FreeTextEntry1] : Oral dental issues with gum disease\par ? thrush txed with rinse\par \par dizzy lightheaded Neuro- Brain MRI February 21, 2022\par Multiple white matter hypersensitivities nonspecific most consistent with chronic microvascular ischemic disease\par Mild global cerebral volume loss likely consistent with age\par No acute infarction\par No reported tumors hemorrhage\par no palpitations no chest pain\par describes facial distortion\par ENT Dr Peraza\par \par Weight stable\par Appetite normal\par Denies any chronic fatigue\par No respiratory symptomatology\par No chest pain exertional chest pain\par No lower extremity edema\par No active urinary symptoms frequency\par DDS ongoing  management

## 2022-06-12 ENCOUNTER — RX RENEWAL (OUTPATIENT)
Age: 77
End: 2022-06-12

## 2022-07-07 ENCOUNTER — NON-APPOINTMENT (OUTPATIENT)
Age: 77
End: 2022-07-07

## 2022-07-07 ENCOUNTER — APPOINTMENT (OUTPATIENT)
Dept: PULMONOLOGY | Facility: CLINIC | Age: 77
End: 2022-07-07

## 2022-07-07 VITALS
OXYGEN SATURATION: 95 % | SYSTOLIC BLOOD PRESSURE: 134 MMHG | TEMPERATURE: 98.4 F | DIASTOLIC BLOOD PRESSURE: 74 MMHG | HEART RATE: 88 BPM

## 2022-07-07 LAB
GLUCOSE BLDC GLUCOMTR-MCNC: 103
HBA1C MFR BLD HPLC: 6.4
TSH SERPL-ACNC: 0.73 UIU/ML

## 2022-07-07 PROCEDURE — 94010 BREATHING CAPACITY TEST: CPT

## 2022-07-07 PROCEDURE — 82962 GLUCOSE BLOOD TEST: CPT

## 2022-07-07 PROCEDURE — 83036 HEMOGLOBIN GLYCOSYLATED A1C: CPT | Mod: QW

## 2022-07-07 PROCEDURE — 95012 NITRIC OXIDE EXP GAS DETER: CPT

## 2022-07-07 PROCEDURE — 99214 OFFICE O/P EST MOD 30 MIN: CPT | Mod: 25

## 2022-07-07 RX ORDER — MONTELUKAST 10 MG/1
10 TABLET, FILM COATED ORAL
Qty: 3 | Refills: 3 | Status: ACTIVE | COMMUNITY
Start: 2022-07-07 | End: 1900-01-01

## 2022-07-07 NOTE — DISCUSSION/SUMMARY
[FreeTextEntry1] : lightheaded- Brain mRI cardiology Neuro lABS\par LILIAM local wheeze- trial singulair\par thrush on oral therapy f/o dental \par PNDS\par Completed well visit Oct 2021\par HTN- stable\par HLD\par Hypothyroidism pots RX change recheck\par Hypercholesterolemia\par PreDM- better dietary control addressed\par Mild diastolic dysfunction\par Chronic osteoarthritis\par Osteoporosis screening/ per GYN\par Continue Norvasc 5 mg\par  Inc Losartan 100  mg\par 1 mos BP check make sure taking generic  Norvasc\par I change dosing to 6 days/week Synthroid 88 MCG 1 tablet p.o. daily-recheck thyroid function testing next visit- \par  \par Avoid ACE inhibitor since patient has an ACE induced cough\par Low sugar diet\par Avoid carbohydrates\par \par Follow-up status of mammogram Completed  noted  management GYN\par Due colonoscopy-  Dennis Bursn MD re recommended\par \par GYN DEXA and Mammogram  management Nov 10 2020 is  set [facility]\par Oral pathology consult\par

## 2022-07-07 NOTE — REVIEW OF SYSTEMS
[Hypertension] : ~T hypertension [Arthralgias] : arthralgias [Negative] : Psychiatric [Headache] : no headache [Dizziness] : no dizziness [Focal Weakness] : no focal weakness [Numbness] : no numbness [Paralysis] : no paralysis was seen [FreeTextEntry9] : IAN [FreeTextEntry6] : OA [de-identified] : Hypothyroidism, glucose intolerance

## 2022-07-07 NOTE — HISTORY OF PRESENT ILLNESS
[Difficulty Breathing During Exertion] : denies dyspnea on exertion [Cough] : denies coughing [Feelings Of Weakness On Exertion] : denies exercise intolerance [Wheezing] : denies wheezing [Regional Soft Tissue Swelling Both Lower Extremities] : denies lower extremity edema [Chest Pain Or Discomfort] : denies chest pain [Fever] : denies fever [Former] : is a former smoker [FreeTextEntry1] : Oral dental issues with gum disease\par ? thrush txed with rinse\par \par dizzy lightheaded Neuro- Brain MRI February 21, 2022\par Multiple white matter hypersensitivities nonspecific most consistent with chronic microvascular ischemic disease\par Mild global cerebral volume loss likely consistent with age\par No acute infarction\par No reported tumors hemorrhage\par no palpitations no chest pain\par describes facial distortion\par ENT Dr Peraza\par \par Weight stable\par Appetite normal\par Denies any chronic fatigue\par No respiratory symptomatology\par No chest pain exertional chest pain\par No lower extremity edema\par No active urinary symptoms frequency\par DDS ongoing  management

## 2022-07-07 NOTE — PHYSICAL EXAM
[General Appearance - Well Developed] : well developed [Normal Appearance] : normal appearance [Well Groomed] : well groomed [General Appearance - Well Nourished] : well nourished [No Deformities] : no deformities [General Appearance - In No Acute Distress] : no acute distress [Normal Conjunctiva] : the conjunctiva exhibited no abnormalities [Eyelids - No Xanthelasma] : the eyelids demonstrated no xanthelasmas [Normal Oropharynx] : normal oropharynx [Neck Appearance] : the appearance of the neck was normal [Neck Cervical Mass (___cm)] : no neck mass was observed [Jugular Venous Distention Increased] : there was no jugular-venous distention [Thyroid Diffuse Enlargement] : the thyroid was not enlarged [Heart Rate And Rhythm] : heart rate and rhythm were normal [Heart Sounds] : normal S1 and S2 [Murmurs] : no murmurs present [Respiration, Rhythm And Depth] : normal respiratory rhythm and effort [Exaggerated Use Of Accessory Muscles For Inspiration] : no accessory muscle use [Chest Palpation] : palpation of the chest revealed no abnormalities [Lungs Percussion] : the lungs were normal to percussion [Abdomen Soft] : soft [Abdomen Tenderness] : non-tender [Abdomen Mass (___ Cm)] : no abdominal mass palpated [Abnormal Walk] : normal gait [Nail Clubbing] : no clubbing of the fingernails [Cyanosis, Localized] : no localized cyanosis [Petechial Hemorrhages (___cm)] : no petechial hemorrhages [] : no ischemic changes [Deep Tendon Reflexes (DTR)] : deep tendon reflexes were 2+ and symmetric [Sensation] : the sensory exam was normal to light touch and pinprick [No Focal Deficits] : no focal deficits [Oriented To Time, Place, And Person] : oriented to person, place, and time [Impaired Insight] : insight and judgment were intact [Affect] : the affect was normal [FreeTextEntry1] : healed shinges over right buttock

## 2022-08-11 ENCOUNTER — LABORATORY RESULT (OUTPATIENT)
Age: 77
End: 2022-08-11

## 2022-08-11 ENCOUNTER — APPOINTMENT (OUTPATIENT)
Dept: PULMONOLOGY | Facility: CLINIC | Age: 77
End: 2022-08-11

## 2022-08-11 VITALS
SYSTOLIC BLOOD PRESSURE: 122 MMHG | TEMPERATURE: 98.3 F | WEIGHT: 120 LBS | HEIGHT: 65 IN | BODY MASS INDEX: 19.99 KG/M2 | DIASTOLIC BLOOD PRESSURE: 70 MMHG | HEART RATE: 87 BPM | OXYGEN SATURATION: 96 %

## 2022-08-11 PROCEDURE — 36415 COLL VENOUS BLD VENIPUNCTURE: CPT

## 2022-08-11 PROCEDURE — 99214 OFFICE O/P EST MOD 30 MIN: CPT | Mod: 25

## 2022-08-11 RX ORDER — NYSTATIN 100000 [USP'U]/ML
100000 SUSPENSION ORAL
Qty: 400 | Refills: 0 | Status: DISCONTINUED | COMMUNITY
Start: 2022-05-03

## 2022-08-11 NOTE — REVIEW OF SYSTEMS
[Hypertension] : ~T hypertension [Arthralgias] : arthralgias [Negative] : Psychiatric [Headache] : no headache [Dizziness] : no dizziness [Focal Weakness] : no focal weakness [Numbness] : no numbness [Paralysis] : no paralysis was seen [FreeTextEntry9] : IAN [de-identified] : Hypothyroidism, glucose intolerance [FreeTextEntry6] : OA

## 2022-08-11 NOTE — DISCUSSION/SUMMARY
[FreeTextEntry1] : lightheaded- Brain mRI cardiology Neuro lABS\par LILIAM local wheeze- trial singulair\par thrush on oral therapy f/o dental  completed tx and no recurrence\par PNDS\par Completed well visit Oct 2021\par HTN- stable\par HLD\par Hypothyroidism pots RX change recheck\par Hypercholesterolemia\par PreDM- better dietary control addressed\par Mild diastolic dysfunction\par Chronic osteoarthritis\par Osteoporosis screening/ per GYN\par Continue Norvasc 5 mg\par  Inc Losartan 100  mg\par 1 mos BP check make sure taking generic  Norvasc\par I change dosing to 6 days/week Synthroid 88 MCG 1 tablet p.o. daily-recheck thyroid function testing next visit- \par  \par Avoid ACE inhibitor since patient has an ACE induced cough\par Low sugar diet\par Avoid carbohydrates\par \par Follow-up status of mammogram Completed  noted  management GYN\par Due colonoscopy-  Dennis Burns MD re recommended\par \par GYN DEXA and Mammogram  management Nov 10 2020 is  set [facility]\par Oral pathology consult\par

## 2022-08-11 NOTE — HISTORY OF PRESENT ILLNESS
[Difficulty Breathing During Exertion] : denies dyspnea on exertion [Feelings Of Weakness On Exertion] : denies exercise intolerance [Cough] : denies coughing [Wheezing] : denies wheezing [Regional Soft Tissue Swelling Both Lower Extremities] : denies lower extremity edema [Chest Pain Or Discomfort] : denies chest pain [Fever] : denies fever [Former] : is a former smoker [FreeTextEntry1] : Oral dental issues with gum disease\par ? thrush txed with rinse completed Nystatin\par \par dizzy lightheaded Neuro- Brain MRI February 21, 2022\par Multiple white matter hypersensitivities nonspecific most consistent with chronic microvascular ischemic disease\par Mild global cerebral volume loss likely consistent with age\par No acute infarction\par No reported tumors hemorrhage\par no palpitations no chest pain\par describes facial distortion\par ENT Dr Peraza\par \par Weight stable\par Appetite normal\par Denies any chronic fatigue\par No respiratory symptomatology\par No chest pain exertional chest pain\par No lower extremity edema\par No active urinary symptoms frequency\par DDS ongoing  management

## 2022-08-12 LAB — TSH SERPL-ACNC: 0.15 UIU/ML

## 2022-08-21 ENCOUNTER — RX RENEWAL (OUTPATIENT)
Age: 77
End: 2022-08-21

## 2022-09-09 ENCOUNTER — RX RENEWAL (OUTPATIENT)
Age: 77
End: 2022-09-09

## 2022-09-15 ENCOUNTER — APPOINTMENT (OUTPATIENT)
Dept: PULMONOLOGY | Facility: CLINIC | Age: 77
End: 2022-09-15

## 2022-09-15 VITALS
HEART RATE: 93 BPM | OXYGEN SATURATION: 95 % | SYSTOLIC BLOOD PRESSURE: 115 MMHG | DIASTOLIC BLOOD PRESSURE: 70 MMHG | TEMPERATURE: 98.5 F

## 2022-09-15 PROCEDURE — 99214 OFFICE O/P EST MOD 30 MIN: CPT | Mod: 25

## 2022-09-15 PROCEDURE — G0008: CPT

## 2022-09-15 PROCEDURE — 90662 IIV NO PRSV INCREASED AG IM: CPT

## 2022-09-15 NOTE — REVIEW OF SYSTEMS
Discharge planning issues [Hypertension] : ~T hypertension Prophylactic measure [Arthralgias] : arthralgias [Negative] : Psychiatric [Headache] : no headache [Dizziness] : no dizziness [Focal Weakness] : no focal weakness [Numbness] : no numbness [Paralysis] : no paralysis was seen [FreeTextEntry9] : IAN [FreeTextEntry6] : OA [de-identified] : Hypothyroidism, glucose intolerance

## 2022-09-15 NOTE — DISCUSSION/SUMMARY
[FreeTextEntry1] : lightheaded- Brain mRI cardiology Neuro lABS\par LILIAM local wheeze- trial singulair\par thrush on oral therapy f/o dental  completed tx and no recurrence\par PNDS\par Completed well visit Oct 2021\par HTN- stable\par HLD\par Hypothyroidism pots RX change recheck\par Hypercholesterolemia\par PreDM- better dietary control addressed\par Mild diastolic dysfunction\par Chronic osteoarthritis\par Osteoporosis screening/ per GYN\par Continue Norvasc 5 mg\par  Inc Losartan 100  mg\par 1 mos BP check make sure taking generic  Norvasc\par I change dosing to 6 days/week Synthroid 88 MCG 1 tablet p.o. daily-recheck thyroid function testing next visit- \par  \par Avoid ACE inhibitor since patient has an ACE induced cough\par Low sugar diet\par Avoid carbohydrates\par \par Follow-up status of mammogram Completed  noted  management GYN\par Due colonoscopy-  Dennis Burns MD re recommended\par \par GYN DEXA and Mammogram  management Nov 10 2020 is  set [facility]\par Oral pathology consult\par

## 2022-09-15 NOTE — PROCEDURE
[FreeTextEntry1] : POCT 7/7/22\par Glucose 103\par HGBA1C 6.4\par \par [place] 7/7/22\par mild reduction flow rates Mild OAD\par NIOX  19  ppb WNL\par \par POCT 4/18/22\par Glucose 103\par HGBA1C 5.7\par \par POCT 1/10/22\par  Glucose  89\par  HGBA1C 6.1\par \par Chest x-ray PA lateral 10/4/2021\par Normal cardiac size\par Clear lung fields\par No parenchymal infiltrates pleural effusions or dominant pulmonary nodules\par Mild S-shaped scoliosis\par Impression overall clear lungs\par \par EKG 10/4/2021\par NSR\par  RSR\par \par POCT 6/2/21\par Glucose \par HGB A1C  6.0/average glucose 126 \par \par POCT 11/30/2020\par Glucose   124\par HGBA1C   5.8\par \par POCT 9/14/2020\par Glucose 83\par \par Blood draw\par POCT july 27 2022\par Glucose 90\par \par Data review June 22, 2022\par Glucose 127\par Hemoglobin A1c 6.0\par \par HGBA!C 6.3 1/2/2022\par \par Chest x-ray PA lateral October 17, 2019\par Cardiac size normal\par Clear lung fields without parenchymal infiltrates pleural effusions dominant pulmonary nodules\par Soft tissue bony structures demonstrate some thinning of the vertebral spine\par Be distended hilum normal\par Impression clear lungs\par \par EKG NSR RSR Oct  12  2020\par NSSST \par Likely  normal  for  age\par \par Blood  draw\par \par DATA review 9/14/20\par T4 normal 6.5\par TSH 0.07\par Will change Synthroid dosing 88 MCG to 6 days/week\par Data review June 22, 2020\par Lipid profile\par Cholesterol 207\par \par HDL 79\par Triglycerides 126\par Liver function panel normal range\par Serum glucose 136\par Serum electrolytes normal\par BUN 16 creatinine 0.69\par TSH mildly elevated 6.81\par Vitamin D 30.0\par COVID 19 IgG antibody negative\par Data review October 17, 2019\par CBC normal range\par TFTs normal with TSH 2.64\par Lipid profile cholesterol 230 HDL 88 \par Triglycerides 114\par Serum electrolytes normal\par Liver function testing normal\par Hemoglobin A1c 6.0% with mean plasma glucose 126\par Addendum October 18, 2019 vitamin D 23.4\par \par HD Flu 9/14/20\par Prevnair IM 10/12/2020\par \par Chest x-ray PA lateral October 26, 2020\par Normal cardiac size\par Clear lung fields\par Lamination bilateral right and left hemidiaphragm\par No parenchymal infiltrate pleural effusions dominant pulmonary nodules\par Soft tissue bony structures demonstrate some thinning vertebral spine\par Very minimal scoliosis\par Nida mediastinum unremarkable\par Impression clear lungs\par \par HD FLU 10/4/21

## 2022-09-17 ENCOUNTER — RX RENEWAL (OUTPATIENT)
Age: 77
End: 2022-09-17

## 2022-10-28 ENCOUNTER — NON-APPOINTMENT (OUTPATIENT)
Age: 77
End: 2022-10-28

## 2022-10-28 ENCOUNTER — APPOINTMENT (OUTPATIENT)
Dept: PULMONOLOGY | Facility: CLINIC | Age: 77
End: 2022-10-28

## 2022-10-28 VITALS — DIASTOLIC BLOOD PRESSURE: 62 MMHG | HEART RATE: 78 BPM | SYSTOLIC BLOOD PRESSURE: 120 MMHG | OXYGEN SATURATION: 97 %

## 2022-10-28 LAB
ALBUMIN: 10
BILIRUB UR QL STRIP: NORMAL
CLARITY UR: CLEAR
COLLECTION METHOD: NORMAL
CREATININE: 50
GLUCOSE UR-MCNC: NORMAL
HCG UR QL: 0.2 EU/DL
HGB UR QL STRIP.AUTO: NORMAL
KETONES UR-MCNC: NORMAL
LEUKOCYTE ESTERASE UR QL STRIP: NORMAL
MICROALBUMIN/CREAT UR TEST STR-RTO: 30
NITRITE UR QL STRIP: NORMAL
PH UR STRIP: 5.5
PROT UR STRIP-MCNC: NORMAL
SP GR UR STRIP: 1.01

## 2022-10-28 PROCEDURE — 99214 OFFICE O/P EST MOD 30 MIN: CPT | Mod: 25

## 2022-10-28 PROCEDURE — 82044 UR ALBUMIN SEMIQUANTITATIVE: CPT | Mod: QW

## 2022-10-28 PROCEDURE — 36415 COLL VENOUS BLD VENIPUNCTURE: CPT

## 2022-10-28 PROCEDURE — 99397 PER PM REEVAL EST PAT 65+ YR: CPT | Mod: 25

## 2022-10-28 PROCEDURE — 93000 ELECTROCARDIOGRAM COMPLETE: CPT

## 2022-10-28 PROCEDURE — 81003 URINALYSIS AUTO W/O SCOPE: CPT | Mod: QW

## 2022-10-28 PROCEDURE — 71046 X-RAY EXAM CHEST 2 VIEWS: CPT

## 2022-10-28 NOTE — DISCUSSION/SUMMARY
[FreeTextEntry1] : Well visit completed October 2022\par PNDS\par HTN- stable\par HLD\par Hypothyroidism pots RX change recheck\par Hypercholesterolemia\par PreDM- better dietary control addressed\par Mild diastolic dysfunction\par Chronic osteoarthritis\par Osteoporosis screening/ per GYN\par Continue Norvasc 5 mg\par  Inc Losartan 100  mg\par 1 mos BP check make sure taking generic  Norvasc\par I change dosing to 6 days/week Synthroid 88 MCG 1 tablet p.o. daily-recheck thyroid function testing next visit- \par  \par Avoid ACE inhibitor since patient has an ACE induced cough\par Low sugar diet\par Avoid carbohydrates\par \par Follow-up status of mammogram Completed  noted  management GYN\par Due colonoscopy-  Dennis Burns MD re recommended\par \par GYN DEXA and Mammogram  management   set [facility]\par Oral pathology consult\par Blood work pending\par Vaccination profile up-to-date\par Recommendation COVID 5 valent vaccine

## 2022-10-28 NOTE — HISTORY OF PRESENT ILLNESS
[Difficulty Breathing During Exertion] : denies dyspnea on exertion [Feelings Of Weakness On Exertion] : denies exercise intolerance [Cough] : denies coughing [Wheezing] : denies wheezing [Regional Soft Tissue Swelling Both Lower Extremities] : denies lower extremity edema [Chest Pain Or Discomfort] : denies chest pain [Fever] : denies fever [Former] : is a former smoker [FreeTextEntry1] : CPE\par No active complaints\par Does have some poor sleep hygiene but that is because of her  with Parkinson's disease\par \par Oral dental issues with gum disease\par ? thrush txed with rinse completed Nystatin\par \par dizzy lightheaded Neuro- Brain MRI February 21, 2022\par Multiple white matter hypersensitivities nonspecific most consistent with chronic microvascular ischemic disease\par Mild global cerebral volume loss likely consistent with age\par No acute infarction\par No reported tumors hemorrhage\par no palpitations no chest pain\par describes facial distortion\par ENT Dr Peraza\par \par Weight stable\par Appetite normal\par Denies any chronic fatigue\par No respiratory symptomatology\par No chest pain exertional chest pain\par No lower extremity edema\par No active urinary symptoms frequency\par DDS ongoing  management

## 2022-10-28 NOTE — PHYSICAL EXAM
[General Appearance - Well Developed] : well developed [Normal Appearance] : normal appearance [Well Groomed] : well groomed [General Appearance - Well Nourished] : well nourished [No Deformities] : no deformities [General Appearance - In No Acute Distress] : no acute distress [Normal Conjunctiva] : the conjunctiva exhibited no abnormalities [Eyelids - No Xanthelasma] : the eyelids demonstrated no xanthelasmas [Normal Oropharynx] : normal oropharynx [Neck Appearance] : the appearance of the neck was normal [Neck Cervical Mass (___cm)] : no neck mass was observed [Jugular Venous Distention Increased] : there was no jugular-venous distention [Thyroid Diffuse Enlargement] : the thyroid was not enlarged [Heart Rate And Rhythm] : heart rate and rhythm were normal [Heart Sounds] : normal S1 and S2 [Murmurs] : no murmurs present [Exaggerated Use Of Accessory Muscles For Inspiration] : no accessory muscle use [Respiration, Rhythm And Depth] : normal respiratory rhythm and effort [Chest Palpation] : palpation of the chest revealed no abnormalities [Lungs Percussion] : the lungs were normal to percussion [Abdomen Soft] : soft [Abdomen Tenderness] : non-tender [Abdomen Mass (___ Cm)] : no abdominal mass palpated [Abnormal Walk] : normal gait [Nail Clubbing] : no clubbing of the fingernails [Cyanosis, Localized] : no localized cyanosis [Petechial Hemorrhages (___cm)] : no petechial hemorrhages [] : no ischemic changes [Deep Tendon Reflexes (DTR)] : deep tendon reflexes were 2+ and symmetric [Sensation] : the sensory exam was normal to light touch and pinprick [No Focal Deficits] : no focal deficits [Oriented To Time, Place, And Person] : oriented to person, place, and time [Impaired Insight] : insight and judgment were intact [Affect] : the affect was normal [FreeTextEntry1] : healed shinges over right buttock

## 2022-10-28 NOTE — REVIEW OF SYSTEMS
[Hypertension] : ~T hypertension [Arthralgias] : arthralgias [Negative] : Psychiatric [Headache] : no headache [Dizziness] : no dizziness [Focal Weakness] : no focal weakness [Numbness] : no numbness [Paralysis] : no paralysis was seen [FreeTextEntry9] : IAN [FreeTextEntry6] : OA [de-identified] : Hypothyroidism, glucose intolerance

## 2022-10-28 NOTE — PROCEDURE
[FreeTextEntry1] : EKG October 20, 2022\par Normal sinus rhythm\par RSR nondiagnostic\par Combined atrial enlargement\par No acute changes\par \par Chest x-ray PA lateral October 28, 2022\par Normal cardiac size\par Lung fields are clear\par No parenchymal infiltrates pleural effusions or dominant pulmonary nodules\par Soft tissue bony structures otherwise unremarkable\par Impression clear lung\par \par POCT 7/7/22\par Glucose 103\par HGBA1C 6.4\par \par Payam 7/7/22\par mild reduction flow rates Mild OAD\par NIOX  19  ppb WNL\par \par POCT 4/18/22\par Glucose 103\par HGBA1C 5.7\par \par POCT 1/10/22\par  Glucose  89\par  HGBA1C 6.1\par \par Chest x-ray PA lateral 10/4/2021\par Normal cardiac size\par Clear lung fields\par No parenchymal infiltrates pleural effusions or dominant pulmonary nodules\par Mild S-shaped scoliosis\par Impression overall clear lungs\par \par EKG 10/4/2021\par NSR\par  RSR\par \par POCT 6/2/21\par Glucose \par HGB A1C  6.0/average glucose 126 \par \par POCT 11/30/2020\par Glucose   124\par HGBA1C   5.8\par \par POCT 9/14/2020\par Glucose 83\par \par Blood draw\par POCT july 27 2022\par Glucose 90\par \par Data review June 22, 2022\par Glucose 127\par Hemoglobin A1c 6.0\par \par HGBA!C 6.3 1/2/2022\par \par Chest x-ray PA lateral October 17, 2019\par Cardiac size normal\par Clear lung fields without parenchymal infiltrates pleural effusions dominant pulmonary nodules\par Soft tissue bony structures demonstrate some thinning of the vertebral spine\par Be distended hilum normal\par Impression clear lungs\par \par EKG NSR RSR Oct  12  2020\par NSSST \par Likely  normal  for  age\par \par Blood  draw\par \par DATA review 9/14/20\par T4 normal 6.5\par TSH 0.07\par Will change Synthroid dosing 88 MCG to 6 days/week\par Data review June 22, 2020\par Lipid profile\par Cholesterol 207\par \par HDL 79\par Triglycerides 126\par Liver function panel normal range\par Serum glucose 136\par Serum electrolytes normal\par BUN 16 creatinine 0.69\par TSH mildly elevated 6.81\par Vitamin D 30.0\par COVID 19 IgG antibody negative\par Data review October 17, 2019\par CBC normal range\par TFTs normal with TSH 2.64\par Lipid profile cholesterol 230 HDL 88 \par Triglycerides 114\par Serum electrolytes normal\par Liver function testing normal\par Hemoglobin A1c 6.0% with mean plasma glucose 126\par Addendum October 18, 2019 vitamin D 23.4\par \par HD Flu 9/14/20\par Prevnair IM 10/12/2020\par \par Chest x-ray PA lateral October 26, 2020\par Normal cardiac size\par Clear lung fields\par Lamination bilateral right and left hemidiaphragm\par No parenchymal infiltrate pleural effusions dominant pulmonary nodules\par Soft tissue bony structures demonstrate some thinning vertebral spine\par Very minimal scoliosis\par Nida mediastinum unremarkable\par Impression clear lungs\par \par HD FLU 10/4/21

## 2022-10-29 ENCOUNTER — NON-APPOINTMENT (OUTPATIENT)
Age: 77
End: 2022-10-29

## 2022-10-29 LAB
ALBUMIN SERPL ELPH-MCNC: 4.6 G/DL
ALP BLD-CCNC: 57 U/L
ALT SERPL-CCNC: 21 U/L
ANION GAP SERPL CALC-SCNC: 12 MMOL/L
AST SERPL-CCNC: 28 U/L
BACTERIA UR CULT: NORMAL
BASOPHILS # BLD AUTO: 0.04 K/UL
BASOPHILS NFR BLD AUTO: 0.5 %
BILIRUB DIRECT SERPL-MCNC: 0.1 MG/DL
BILIRUB INDIRECT SERPL-MCNC: 0.2 MG/DL
BILIRUB SERPL-MCNC: 0.3 MG/DL
BUN SERPL-MCNC: 13 MG/DL
CALCIUM SERPL-MCNC: 9 MG/DL
CHLORIDE SERPL-SCNC: 103 MMOL/L
CHOLEST SERPL-MCNC: 176 MG/DL
CO2 SERPL-SCNC: 24 MMOL/L
CREAT SERPL-MCNC: 0.6 MG/DL
EGFR: 92 ML/MIN/1.73M2
EOSINOPHIL # BLD AUTO: 0.14 K/UL
EOSINOPHIL NFR BLD AUTO: 1.9 %
ESTIMATED AVERAGE GLUCOSE: 131 MG/DL
GLUCOSE SERPL-MCNC: 99 MG/DL
HBA1C MFR BLD HPLC: 6.2 %
HCT VFR BLD CALC: 38.9 %
HDLC SERPL-MCNC: 98 MG/DL
HGB BLD-MCNC: 12.4 G/DL
IMM GRANULOCYTES NFR BLD AUTO: 0.3 %
LDLC SERPL CALC-MCNC: 64 MG/DL
LYMPHOCYTES # BLD AUTO: 1.42 K/UL
LYMPHOCYTES NFR BLD AUTO: 19.2 %
MAN DIFF?: NORMAL
MCHC RBC-ENTMCNC: 30.4 PG
MCHC RBC-ENTMCNC: 31.9 GM/DL
MCV RBC AUTO: 95.3 FL
MONOCYTES # BLD AUTO: 0.59 K/UL
MONOCYTES NFR BLD AUTO: 8 %
NEUTROPHILS # BLD AUTO: 5.18 K/UL
NEUTROPHILS NFR BLD AUTO: 70.1 %
NONHDLC SERPL-MCNC: 79 MG/DL
PLATELET # BLD AUTO: 345 K/UL
POTASSIUM SERPL-SCNC: 4.3 MMOL/L
PROT SERPL-MCNC: 6.9 G/DL
RBC # BLD: 4.08 M/UL
RBC # FLD: 13 %
SODIUM SERPL-SCNC: 138 MMOL/L
T4 SERPL-MCNC: 7.6 UG/DL
TRIGL SERPL-MCNC: 71 MG/DL
TSH SERPL-ACNC: 1.44 UIU/ML
WBC # FLD AUTO: 7.39 K/UL

## 2022-11-01 LAB — URINE CYTOLOGY: NORMAL

## 2022-11-17 ENCOUNTER — APPOINTMENT (OUTPATIENT)
Dept: MAMMOGRAPHY | Facility: CLINIC | Age: 77
End: 2022-11-17

## 2022-11-17 ENCOUNTER — RESULT REVIEW (OUTPATIENT)
Age: 77
End: 2022-11-17

## 2022-11-17 ENCOUNTER — APPOINTMENT (OUTPATIENT)
Dept: ULTRASOUND IMAGING | Facility: CLINIC | Age: 77
End: 2022-11-17

## 2022-11-17 PROCEDURE — 77063 BREAST TOMOSYNTHESIS BI: CPT

## 2022-11-17 PROCEDURE — 77067 SCR MAMMO BI INCL CAD: CPT

## 2022-11-17 PROCEDURE — 76641 ULTRASOUND BREAST COMPLETE: CPT | Mod: 50

## 2022-11-18 ENCOUNTER — NON-APPOINTMENT (OUTPATIENT)
Age: 77
End: 2022-11-18

## 2022-11-22 ENCOUNTER — APPOINTMENT (OUTPATIENT)
Dept: OBGYN | Facility: CLINIC | Age: 77
End: 2022-11-22

## 2022-11-22 VITALS
HEART RATE: 83 BPM | SYSTOLIC BLOOD PRESSURE: 128 MMHG | BODY MASS INDEX: 17.83 KG/M2 | HEIGHT: 65 IN | WEIGHT: 107 LBS | DIASTOLIC BLOOD PRESSURE: 69 MMHG

## 2022-11-22 DIAGNOSIS — N95.2 POSTMENOPAUSAL ATROPHIC VAGINITIS: ICD-10-CM

## 2022-11-22 PROCEDURE — 99213 OFFICE O/P EST LOW 20 MIN: CPT

## 2022-11-22 NOTE — DISCUSSION/SUMMARY
[FreeTextEntry1] : A - WWV\par       postmenopausal atrophic vaginitis\par        HTN\par        Hypothyroidism\par \par p- f/u 1 year\par     pt no longer requires paps\par     pt is due next for mammo in nov. 2023\par     referral given for Dexa in March 2023\par    nutritional counseling provided\par     exercise encouraged\par     referral for colorectal screening  LAMAR Eckert - given. 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

## 2022-11-22 NOTE — HISTORY OF PRESENT ILLNESS
[FreeTextEntry1] : 77 y.o. P 2  postmenopausal female presents for annual exam. pt feels well, PMH - HTN, on Amlodipine, and Losartan , Hypothyroidism - on levothyroxine, pt also takes a statin,  as  well, PCP -  Roger Dial. PSHx - negative, FH - negative, SH - negative, GYN hx - negative, OB hx -  x 2. \par ROS - retired from childcare. \par Dexa 2021 - WNL. \par Mammo/Breast sono , 22 - Birads-1 \par pap /hpv  - WnL/neg.

## 2022-11-25 ENCOUNTER — RX RENEWAL (OUTPATIENT)
Age: 77
End: 2022-11-25

## 2022-11-30 ENCOUNTER — APPOINTMENT (OUTPATIENT)
Dept: PULMONOLOGY | Facility: CLINIC | Age: 77
End: 2022-11-30

## 2022-11-30 VITALS
RESPIRATION RATE: 16 BRPM | SYSTOLIC BLOOD PRESSURE: 122 MMHG | OXYGEN SATURATION: 95 % | TEMPERATURE: 98.2 F | DIASTOLIC BLOOD PRESSURE: 71 MMHG | HEART RATE: 91 BPM

## 2022-11-30 PROCEDURE — 99214 OFFICE O/P EST MOD 30 MIN: CPT

## 2022-11-30 NOTE — DISCUSSION/SUMMARY
[FreeTextEntry1] : Well visit completed October 2022\par GYN noted\par PNDS\par HTN- stable\par HLD\par Hypothyroidism pots RX change recheck\par Hypercholesterolemia\par PreDM- better dietary control addressed\par Mild diastolic dysfunction\par Chronic osteoarthritis\par Osteoporosis screening/ per GYN\par Continue Norvasc 5 mg\par Losartan 100  mg\par I change dosing to 6 days/week Synthroid 88 MCG 1 tablet p.o. daily-recheck thyroid function testing next visit- \par  \par Avoid ACE inhibitor since patient has an ACE induced cough\par Low sugar diet\par Avoid carbohydrates\par \par Follow-up status of mammogram Completed  noted  management GYN\par Due colonoscopy-  Dennis Burns MD re recommended\par \par GYN DEXA and Mammogram  management   set [facility]\par Vaccination profile up-to-date\par Recommendation COVID Bi valent vaccine

## 2022-11-30 NOTE — PROCEDURE
[FreeTextEntry1] : EKG October 20, 2022\par Normal sinus rhythm\par RSR nondiagnostic\par Combined atrial enlargement\par No acute changes\par \par Chest x-ray PA lateral October 28, 2022\par Normal cardiac size\par Lung fields are clear\par No parenchymal infiltrates pleural effusions or dominant pulmonary nodules\par Soft tissue bony structures otherwise unremarkable\par Impression clear lung\par \par POCT 7/7/22\par Glucose 103\par HGBA1C 6.4\par \par Payam 7/7/22\par mild reduction flow rates Mild OAD\par NIOX  19  ppb WNL\par \par POCT 4/18/22\par Glucose 103\par HGBA1C 5.7\par \par POCT 1/10/22\par  Glucose  89\par  HGBA1C 6.1\par \par Chest x-ray PA lateral 10/4/2021\par Normal cardiac size\par Clear lung fields\par No parenchymal infiltrates pleural effusions or dominant pulmonary nodules\par Mild S-shaped scoliosis\par Impression overall clear lungs\par \par EKG 10/4/2021\par NSR\par  RSR\par \par POCT 6/2/21\par Glucose \par HGB A1C  6.0/average glucose 126 \par \par POCT 11/30/2020\par Glucose   124\par HGBA1C   5.8\par \par POCT 9/14/2020\par Glucose 83\par \par Blood draw\par POCT july 27 2022\par Glucose 90\par \par Data review June 22, 2022\par Glucose 127\par Hemoglobin A1c 6.0\par \par HGBA!C 6.3 1/2/2022\par \par Chest x-ray PA lateral October 17, 2019\par Cardiac size normal\par Clear lung fields without parenchymal infiltrates pleural effusions dominant pulmonary nodules\par Soft tissue bony structures demonstrate some thinning of the vertebral spine\par Be distended hilum normal\par Impression clear lungs\par \par EKG NSR RSR Oct  12  2020\par NSSST \par Likely  normal  for  age\par \par Blood  draw\par \par DATA review 9/14/20\par T4 normal 6.5\par TSH 0.07\par Will change Synthroid dosing 88 MCG to 6 days/week\par Data review June 22, 2020\par Lipid profile\par Cholesterol 207\par \par HDL 79\par Triglycerides 126\par Liver function panel normal range\par Serum glucose 136\par Serum electrolytes normal\par BUN 16 creatinine 0.69\par TSH mildly elevated 6.81\par Vitamin D 30.0\par COVID 19 IgG antibody negative\par Data review October 17, 2019\par CBC normal range\par TFTs normal with TSH 2.64\par Lipid profile cholesterol 230 HDL 88 \par Triglycerides 114\par Serum electrolytes normal\par Liver function testing normal\par Hemoglobin A1c 6.0% with mean plasma glucose 126\par Addendum October 18, 2019 vitamin D 23.4\par \par HD Flu 9/14/20\par Prevnair IM 10/12/2020\par \par Chest x-ray PA lateral October 26, 2020\par Normal cardiac size\par Clear lung fields\par Lamination bilateral right and left hemidiaphragm\par No parenchymal infiltrate pleural effusions dominant pulmonary nodules\par Soft tissue bony structures demonstrate some thinning vertebral spine\par Very minimal scoliosis\par Nida mediastinum unremarkable\par Impression clear lungs\par \par

## 2022-11-30 NOTE — HISTORY OF PRESENT ILLNESS
[Difficulty Breathing During Exertion] : denies dyspnea on exertion [Feelings Of Weakness On Exertion] : denies exercise intolerance [Cough] : denies coughing [Wheezing] : denies wheezing [Regional Soft Tissue Swelling Both Lower Extremities] : denies lower extremity edema [Chest Pain Or Discomfort] : denies chest pain [Fever] : denies fever [Former] : is a former smoker [FreeTextEntry1] : CPE completed GYN completed\par No active complaints\par Does have some poor sleep hygiene but that is because of her  with Parkinson's disease\par \par Oral dental issues with gum disease\par ? thrush txed with rinse completed Nystatin\par \par dizzy lightheaded Neuro- Brain MRI February 21, 2022\par Multiple white matter hypersensitivities nonspecific most consistent with chronic microvascular ischemic disease\par Mild global cerebral volume loss likely consistent with age\par No acute infarction\par No reported tumors hemorrhage\par no palpitations no chest pain\par describes facial distortion\par \par Weight stable\par Appetite normal\par Denies any chronic fatigue\par No respiratory symptomatology\par No chest pain exertional chest pain\par No lower extremity edema\par No active urinary symptoms frequency\par DDS ongoing  management

## 2022-11-30 NOTE — REVIEW OF SYSTEMS
[Hypertension] : ~T hypertension [Arthralgias] : arthralgias [Negative] : Psychiatric [Headache] : no headache [Dizziness] : no dizziness [Focal Weakness] : no focal weakness [Numbness] : no numbness [Paralysis] : no paralysis was seen [FreeTextEntry9] : IAN [FreeTextEntry6] : OA [de-identified] : Hypothyroidism, glucose intolerance

## 2022-12-05 ENCOUNTER — APPOINTMENT (OUTPATIENT)
Dept: RADIOLOGY | Facility: CLINIC | Age: 77
End: 2022-12-05

## 2022-12-09 ENCOUNTER — RX RENEWAL (OUTPATIENT)
Age: 77
End: 2022-12-09

## 2023-02-01 ENCOUNTER — APPOINTMENT (OUTPATIENT)
Dept: PULMONOLOGY | Facility: CLINIC | Age: 78
End: 2023-02-01
Payer: MEDICARE

## 2023-02-01 VITALS — SYSTOLIC BLOOD PRESSURE: 146 MMHG | OXYGEN SATURATION: 97 % | HEART RATE: 85 BPM | DIASTOLIC BLOOD PRESSURE: 78 MMHG

## 2023-02-01 LAB
GLUCOSE BLDC GLUCOMTR-MCNC: 82
HBA1C MFR BLD HPLC: 6.3

## 2023-02-01 PROCEDURE — 82962 GLUCOSE BLOOD TEST: CPT

## 2023-02-01 PROCEDURE — 99214 OFFICE O/P EST MOD 30 MIN: CPT | Mod: 25

## 2023-02-01 PROCEDURE — 83036 HEMOGLOBIN GLYCOSYLATED A1C: CPT | Mod: QW

## 2023-02-01 PROCEDURE — 36415 COLL VENOUS BLD VENIPUNCTURE: CPT

## 2023-02-01 NOTE — REVIEW OF SYSTEMS
[Hypertension] : ~T hypertension [Arthralgias] : arthralgias [Negative] : Psychiatric [Headache] : no headache [Dizziness] : no dizziness [Focal Weakness] : no focal weakness [Numbness] : no numbness [Paralysis] : no paralysis was seen [FreeTextEntry9] : IAN [FreeTextEntry6] : OA [de-identified] : Hypothyroidism, glucose intolerance

## 2023-02-01 NOTE — PROCEDURE
[FreeTextEntry1] : POCT 2/1/23\par HGBA1C 6.3\par \par EKG October 20, 2022\par Normal sinus rhythm\par RSR nondiagnostic\par Combined atrial enlargement\par No acute changes\par \par Chest x-ray PA lateral October 28, 2022\par Normal cardiac size\par Lung fields are clear\par No parenchymal infiltrates pleural effusions or dominant pulmonary nodules\par Soft tissue bony structures otherwise unremarkable\par Impression clear lung\par \par POCT 7/7/22\par Glucose 103\par HGBA1C 6.4\par \par Payam 7/7/22\par mild reduction flow rates Mild OAD\par NIOX  19  ppb WNL\par \par POCT 4/18/22\par Glucose 103\par HGBA1C 5.7\par \par POCT 1/10/22\par  Glucose  89\par  HGBA1C 6.1\par \par Chest x-ray PA lateral 10/4/2021\par Normal cardiac size\par Clear lung fields\par No parenchymal infiltrates pleural effusions or dominant pulmonary nodules\par Mild S-shaped scoliosis\par Impression overall clear lungs\par \par EKG 10/4/2021\par NSR\par  RSR\par \par POCT 6/2/21\par Glucose \par HGB A1C  6.0/average glucose 126 \par \par POCT 11/30/2020\par Glucose   124\par HGBA1C   5.8\par \par POCT 9/14/2020\par Glucose 83\par \par Blood draw\par POCT july 27 2022\par Glucose 90\par \par Data review June 22, 2022\par Glucose 127\par Hemoglobin A1c 6.0\par \par HGBA!C 6.3 1/2/2022\par \par Chest x-ray PA lateral October 17, 2019\par Cardiac size normal\par Clear lung fields without parenchymal infiltrates pleural effusions dominant pulmonary nodules\par Soft tissue bony structures demonstrate some thinning of the vertebral spine\par Be distended hilum normal\par Impression clear lungs\par \par EKG NSR RSR Oct  12  2020\par NSSST \par Likely  normal  for  age\par \par Blood  draw\par \par DATA review 9/14/20\par T4 normal 6.5\par TSH 0.07\par Will change Synthroid dosing 88 MCG to 6 days/week\par Data review June 22, 2020\par Lipid profile\par Cholesterol 207\par \par HDL 79\par Triglycerides 126\par Liver function panel normal range\par Serum glucose 136\par Serum electrolytes normal\par BUN 16 creatinine 0.69\par TSH mildly elevated 6.81\par Vitamin D 30.0\par COVID 19 IgG antibody negative\par Data review October 17, 2019\par CBC normal range\par TFTs normal with TSH 2.64\par Lipid profile cholesterol 230 HDL 88 \par Triglycerides 114\par Serum electrolytes normal\par Liver function testing normal\par Hemoglobin A1c 6.0% with mean plasma glucose 126\par Addendum October 18, 2019 vitamin D 23.4\par \par HD Flu 9/14/20\par Prevnair IM 10/12/2020\par \par Chest x-ray PA lateral October 26, 2020\par Normal cardiac size\par Clear lung fields\par Lamination bilateral right and left hemidiaphragm\par No parenchymal infiltrate pleural effusions dominant pulmonary nodules\par Soft tissue bony structures demonstrate some thinning vertebral spine\par Very minimal scoliosis\par Nida mediastinum unremarkable\par Impression clear lungs\par \par

## 2023-02-01 NOTE — DISCUSSION/SUMMARY
[FreeTextEntry1] : Well visit completed October 2022\par GYN noted\par PNDS\par HTN- stable\par HLD\par Hypothyroidism pots RX change recheck\par Hypercholesterolemia\par PreDM- better dietary control addressed Trend Q 3 mos A1C\par Mild diastolic dysfunction\par Chronic osteoarthritis\par Osteoporosis screening/ per GYN\par Continue Norvasc 5 mg\par Losartan 100  mg\par I change dosing to 6 days/week Synthroid 88 MCG 1 tablet p.o. daily-recheck thyroid function testing next visit- \par  \par Avoid ACE inhibitor since patient has an ACE induced cough\par Low sugar diet\par Avoid carbohydrates\par \par Follow-up status of mammogram Completed  noted  management GYN\par Due colonoscopy-  Dennis Burns MD re recommended\par \par GYN DEXA and Mammogram  management   set [facility]\par Vaccination profile up-to-date\par Recommendation COVID Bi valent vaccine

## 2023-02-01 NOTE — HISTORY OF PRESENT ILLNESS
[Difficulty Breathing During Exertion] : denies dyspnea on exertion [Feelings Of Weakness On Exertion] : denies exercise intolerance [Cough] : denies coughing [Wheezing] : denies wheezing [Regional Soft Tissue Swelling Both Lower Extremities] : denies lower extremity edema [Chest Pain Or Discomfort] : denies chest pain [Fever] : denies fever [Former] : is a former smoker [FreeTextEntry1] : CPE Oct 28 2022\par  completed GYN completed\par No active complaints\par Does have some poor sleep hygiene but that is because of her  with Parkinson's disease\par \par Oral dental issues with gum disease\par ? thrush txed with rinse completed Nystatin\par \par dizzy lightheaded Neuro- Brain MRI February 21, 2022\par Multiple white matter hypersensitivities nonspecific most consistent with chronic microvascular ischemic disease\par Mild global cerebral volume loss likely consistent with age\par No acute infarction\par No reported tumors hemorrhage\par no palpitations no chest pain\par describes facial distortion\par \par Weight stable\par Appetite normal\par Denies any chronic fatigue\par No respiratory symptomatology\par No chest pain exertional chest pain\par No lower extremity edema\par No active urinary symptoms frequency\par DDS ongoing  management

## 2023-02-02 LAB
ANION GAP SERPL CALC-SCNC: 12 MMOL/L
BUN SERPL-MCNC: 15 MG/DL
CALCIUM SERPL-MCNC: 9.5 MG/DL
CHLORIDE SERPL-SCNC: 105 MMOL/L
CHOLEST SERPL-MCNC: 196 MG/DL
CO2 SERPL-SCNC: 25 MMOL/L
CREAT SERPL-MCNC: 0.58 MG/DL
EGFR: 93 ML/MIN/1.73M2
GLUCOSE SERPL-MCNC: 71 MG/DL
HDLC SERPL-MCNC: 102 MG/DL
LDLC SERPL CALC-MCNC: 80 MG/DL
NONHDLC SERPL-MCNC: 94 MG/DL
POTASSIUM SERPL-SCNC: 5.1 MMOL/L
SODIUM SERPL-SCNC: 142 MMOL/L
TRIGL SERPL-MCNC: 70 MG/DL
TSH SERPL-ACNC: 1.74 UIU/ML

## 2023-02-27 ENCOUNTER — RX RENEWAL (OUTPATIENT)
Age: 78
End: 2023-02-27

## 2023-03-02 ENCOUNTER — RX RENEWAL (OUTPATIENT)
Age: 78
End: 2023-03-02

## 2023-03-03 ENCOUNTER — RX RENEWAL (OUTPATIENT)
Age: 78
End: 2023-03-03

## 2023-03-15 ENCOUNTER — APPOINTMENT (OUTPATIENT)
Dept: PULMONOLOGY | Facility: CLINIC | Age: 78
End: 2023-03-15
Payer: MEDICARE

## 2023-03-15 VITALS — OXYGEN SATURATION: 96 % | HEART RATE: 96 BPM | SYSTOLIC BLOOD PRESSURE: 133 MMHG | DIASTOLIC BLOOD PRESSURE: 74 MMHG

## 2023-03-15 PROCEDURE — 94010 BREATHING CAPACITY TEST: CPT

## 2023-03-15 PROCEDURE — 99214 OFFICE O/P EST MOD 30 MIN: CPT | Mod: 25

## 2023-03-15 NOTE — PROCEDURE
[FreeTextEntry1] : Payam 3/15/23\par  Mild OAD \par interval improvement at flow  rates\par \par POCT 2/1/23\par HGBA1C 6.3\par \par EKG October 20, 2022\par Normal sinus rhythm\par RSR nondiagnostic\par Combined atrial enlargement\par No acute changes\par \par Chest x-ray PA lateral October 28, 2022\par Normal cardiac size\par Lung fields are clear\par No parenchymal infiltrates pleural effusions or dominant pulmonary nodules\par Soft tissue bony structures otherwise unremarkable\par Impression clear lung\par \par POCT 7/7/22\par Glucose 103\par HGBA1C 6.4\par \par Payam 7/7/22\par mild reduction flow rates Mild OAD\par NIOX  19  ppb WNL\par \par POCT 4/18/22\par Glucose 103\par HGBA1C 5.7\par \par POCT 1/10/22\par  Glucose  89\par  HGBA1C 6.1\par \par Chest x-ray PA lateral 10/4/2021\par Normal cardiac size\par Clear lung fields\par No parenchymal infiltrates pleural effusions or dominant pulmonary nodules\par Mild S-shaped scoliosis\par Impression overall clear lungs\par \par EKG 10/4/2021\par NSR\par  RSR\par \par POCT 6/2/21\par Glucose \par HGB A1C  6.0/average glucose 126 \par \par POCT 11/30/2020\par Glucose   124\par HGBA1C   5.8\par \par POCT 9/14/2020\par Glucose 83\par \par Blood draw\par POCT july 27 2022\par Glucose 90\par \par Data review June 22, 2022\par Glucose 127\par Hemoglobin A1c 6.0\par \par HGBA!C 6.3 1/2/2022\par \par Chest x-ray PA lateral October 17, 2019\par Cardiac size normal\par Clear lung fields without parenchymal infiltrates pleural effusions dominant pulmonary nodules\par Soft tissue bony structures demonstrate some thinning of the vertebral spine\par Be distended hilum normal\par Impression clear lungs\par \par EKG NSR RSR Oct  12  2020\par NSSST \par Likely  normal  for  age\par \par Blood  draw\par \par DATA review 9/14/20\par T4 normal 6.5\par TSH 0.07\par Will change Synthroid dosing 88 MCG to 6 days/week\par Data review June 22, 2020\par Lipid profile\par Cholesterol 207\par \par HDL 79\par Triglycerides 126\par Liver function panel normal range\par Serum glucose 136\par Serum electrolytes normal\par BUN 16 creatinine 0.69\par TSH mildly elevated 6.81\par Vitamin D 30.0\par COVID 19 IgG antibody negative\par Data review October 17, 2019\par CBC normal range\par TFTs normal with TSH 2.64\par Lipid profile cholesterol 230 HDL 88 \par Triglycerides 114\par Serum electrolytes normal\par Liver function testing normal\par Hemoglobin A1c 6.0% with mean plasma glucose 126\par Addendum October 18, 2019 vitamin D 23.4\par \par HD Flu 9/14/20\par Prevnair IM 10/12/2020\par \par Chest x-ray PA lateral October 26, 2020\par Normal cardiac size\par Clear lung fields\par Lamination bilateral right and left hemidiaphragm\par No parenchymal infiltrate pleural effusions dominant pulmonary nodules\par Soft tissue bony structures demonstrate some thinning vertebral spine\par Very minimal scoliosis\par Nida mediastinum unremarkable\par Impression clear lungs\par \par

## 2023-03-15 NOTE — REVIEW OF SYSTEMS
From: Jennifer Hickey  To: Nurse Practicioner Selma Rosales  Sent: 2/28/2021 6:51 PM PST  Subject: Non-Urgent Medical Question    No other symptoms no sinus pressure no congestion.       ----- Message -----   From:Nurse Practicioner Selma Rosales   Sent:2/28/2021 6:48 PM PST   To:Jennifer Hickey   Subject:RE: Non-Urgent Medical Question    Are you having sinus pressure or congestion? Any other symptoms? It may be best for you to make an appointment to discuss.  Asuncion OJEDA      ----- Message -----   From:Jennifer Hickey   Sent:2/28/2021 10:07 AM PST   To:Nurse Practicioner Selma Rosales   Subject:Non-Urgent Medical Question    Ho Hahn,     The past few days I have had a few symptoms that I have never had, I'm feeling unbalanced, headaches a couple days and dizzy ness this started on 2/24/21. Ears feel clogged head feels cloudy.     Thanks   [Hypertension] : ~T hypertension [Arthralgias] : arthralgias [Negative] : Psychiatric [Headache] : no headache [Dizziness] : no dizziness [Focal Weakness] : no focal weakness [Numbness] : no numbness [Paralysis] : no paralysis was seen [FreeTextEntry9] : IAN [FreeTextEntry6] : OA [de-identified] : Hypothyroidism, glucose intolerance

## 2023-03-15 NOTE — DISCUSSION/SUMMARY
[FreeTextEntry1] : Well visit completed October 2022\par GYN noted\par PNDS\par HTN- stable\par HLD\par Hypothyroidism pots RX change recheck\par Hypercholesterolemia\par PreDM- better dietary control addressed Trend Q 3 mos A1C\par Mild diastolic dysfunction\par Chronic osteoarthritis\par Mild OAD  ASX without decline at flow  rates\par Osteoporosis screening/ per GYN\par Continue Norvasc 5 mg\par Losartan 100  mg\par I change dosing to 6 days/week Synthroid 88 MCG 1 tablet p.o. daily-recheck thyroid function testing next visit- \par  \par Avoid ACE inhibitor since patient has an ACE induced cough\par Low sugar diet\par Avoid carbohydrates\par \par Follow-up status of mammogram Completed  noted  management GYN\par Due colonoscopy-  Dennis Burns MD re recommended\par \par GYN DEXA and Mammogram  management    up  to  date\par Vaccination profile up-to-date\par Completed  10 2022 COVID Bi valent vaccine

## 2023-03-15 NOTE — PHYSICAL EXAM
[General Appearance - Well Developed] : well developed [Normal Appearance] : normal appearance [Well Groomed] : well groomed [General Appearance - Well Nourished] : well nourished [No Deformities] : no deformities [General Appearance - In No Acute Distress] : no acute distress [Normal Conjunctiva] : the conjunctiva exhibited no abnormalities [Eyelids - No Xanthelasma] : the eyelids demonstrated no xanthelasmas [Normal Oropharynx] : normal oropharynx [Neck Appearance] : the appearance of the neck was normal [Neck Cervical Mass (___cm)] : no neck mass was observed [Jugular Venous Distention Increased] : there was no jugular-venous distention [Thyroid Diffuse Enlargement] : the thyroid was not enlarged [Heart Rate And Rhythm] : heart rate and rhythm were normal [Heart Sounds] : normal S1 and S2 [Murmurs] : no murmurs present [Respiration, Rhythm And Depth] : normal respiratory rhythm and effort [Exaggerated Use Of Accessory Muscles For Inspiration] : no accessory muscle use [Chest Palpation] : palpation of the chest revealed no abnormalities [Lungs Percussion] : the lungs were normal to percussion [Abdomen Soft] : soft [Abdomen Tenderness] : non-tender [Abdomen Mass (___ Cm)] : no abdominal mass palpated [Abnormal Walk] : normal gait [Cyanosis, Localized] : no localized cyanosis [Nail Clubbing] : no clubbing of the fingernails [Petechial Hemorrhages (___cm)] : no petechial hemorrhages [] : no ischemic changes [Deep Tendon Reflexes (DTR)] : deep tendon reflexes were 2+ and symmetric [Sensation] : the sensory exam was normal to light touch and pinprick [Oriented To Time, Place, And Person] : oriented to person, place, and time [No Focal Deficits] : no focal deficits [Impaired Insight] : insight and judgment were intact [Affect] : the affect was normal [FreeTextEntry1] : healed shinges over right buttock

## 2023-03-28 NOTE — ED PROVIDER NOTE - TEMPLATE, MLM
General Skin Substitute Text: The defect edges were debeveled with a #15 scalpel blade.  Given the location of the defect, shape of the defect and the proximity to free margins a skin substitute graft was deemed most appropriate.  The graft material was trimmed to fit the size of the defect. The graft was then placed in the primary defect and oriented appropriately.

## 2023-04-14 ENCOUNTER — APPOINTMENT (OUTPATIENT)
Dept: PULMONOLOGY | Facility: CLINIC | Age: 78
End: 2023-04-14
Payer: MEDICARE

## 2023-04-14 ENCOUNTER — RX RENEWAL (OUTPATIENT)
Age: 78
End: 2023-04-14

## 2023-04-14 VITALS — DIASTOLIC BLOOD PRESSURE: 69 MMHG | SYSTOLIC BLOOD PRESSURE: 125 MMHG | HEART RATE: 98 BPM | OXYGEN SATURATION: 95 %

## 2023-04-14 DIAGNOSIS — J30.9 ALLERGIC RHINITIS, UNSPECIFIED: ICD-10-CM

## 2023-04-14 PROCEDURE — 94010 BREATHING CAPACITY TEST: CPT

## 2023-04-14 PROCEDURE — 99214 OFFICE O/P EST MOD 30 MIN: CPT | Mod: 25

## 2023-04-14 RX ORDER — AZELASTINE HYDROCHLORIDE 137 UG/1
0.1 SPRAY, METERED NASAL TWICE DAILY
Qty: 90 | Refills: 0 | Status: ACTIVE | COMMUNITY
Start: 2023-04-14 | End: 1900-01-01

## 2023-04-14 NOTE — DISCUSSION/SUMMARY
[FreeTextEntry1] : Well visit completed October 2022\par allergic  rhinitis\par GYN noted\par PNDS\par OAD  faint wheeze\par HTN- stable\par HLD\par Hypothyroidism pots RX change recheck\par Hypercholesterolemia\par PreDM- better dietary control addressed Trend Q 3 mos A1C\par Mild diastolic dysfunction\par Chronic osteoarthritis\par Mild OAD  ASX without decline at flow  rates\par Osteoporosis screening/ per GYN\par Continue Norvasc 5 mg\par Losartan 100  mg\par I change dosing to 6 days/week Synthroid 88 MCG 1 tablet p.o. daily-recheck thyroid function testing next visit- \par  \par Avoid ACE inhibitor since patient has an ACE induced cough\par Low sugar diet\par Avoid carbohydrates\par \par Follow-up status of mammogram Completed  noted  management GYN\par Due colonoscopy-  Dennis Burns MD re recommended\par \par GYN DEXA and Mammogram  management    up  to  date\par Vaccination profile up-to-date\par Completed  10 2022 COVID Bi valent vaccine

## 2023-04-14 NOTE — REVIEW OF SYSTEMS
[Hypertension] : ~T hypertension [Arthralgias] : arthralgias [Negative] : Genitourinary/GYN [Headache] : no headache [Dizziness] : no dizziness [Focal Weakness] : no focal weakness [Numbness] : no numbness [Paralysis] : no paralysis was seen [FreeTextEntry9] : IAN [FreeTextEntry6] : OA [de-identified] : Hypothyroidism, glucose intolerance

## 2023-04-14 NOTE — PROCEDURE
[FreeTextEntry1] : [place] 4/14/23\par mild OLAD with mild  reduction flow  rates\par \par [place] 3/15/23\par  Mild OAD \par interval improvement at flow  rates\par \par PO[place] 2/1/23\par HGBA1C 6.3\par \par EKG October 20, 2022\par Normal sinus rhythm\par RSR nondiagnostic\par Combined atrial enlargement\par No acute changes\par \par Chest x-ray PA lateral October 28, 2022\par Normal cardiac size\par Lung fields are clear\par No parenchymal infiltrates pleural effusions or dominant pulmonary nodules\par Soft tissue bony structures otherwise unremarkable\par Impression clear lung\par \par POCT 7/7/22\par Glucose 103\par HGBA1C 6.4\par \par [place] 7/7/22\par mild reduction flow rates Mild OAD\par NIOX  19  ppb WNL\par \par POCT 4/18/22\par Glucose 103\par HGBA1C 5.7\par \par POCT 1/10/22\par  Glucose  89\par  HGBA1C 6.1\par \par Chest x-ray PA lateral 10/4/2021\par Normal cardiac size\par Clear lung fields\par No parenchymal infiltrates pleural effusions or dominant pulmonary nodules\par Mild S-shaped scoliosis\par Impression overall clear lungs\par \par EKG 10/4/2021\par NSR\par  RSR\par \par POCT 6/2/21\par Glucose \par HGB A1C  6.0/average glucose 126 \par \par POCT 11/30/2020\par Glucose   124\par HGBA1C   5.8\par \par POCT 9/14/2020\par Glucose 83\par \par Blood draw\par POCT july 27 2022\par Glucose 90\par \par Data review June 22, 2022\par Glucose 127\par Hemoglobin A1c 6.0\par \par HGBA!C 6.3 1/2/2022\par \par Chest x-ray PA lateral October 17, 2019\par Cardiac size normal\par Clear lung fields without parenchymal infiltrates pleural effusions dominant pulmonary nodules\par Soft tissue bony structures demonstrate some thinning of the vertebral spine\par Be distended hilum normal\par Impression clear lungs\par \par EKG NSR RSR Oct  12  2020\par NSSST \par Likely  normal  for  age\par \par Blood  draw\par \par DATA review 9/14/20\par T4 normal 6.5\par TSH 0.07\par Will change Synthroid dosing 88 MCG to 6 days/week\par Data review June 22, 2020\par Lipid profile\par Cholesterol 207\par \par HDL 79\par Triglycerides 126\par Liver function panel normal range\par Serum glucose 136\par Serum electrolytes normal\par BUN 16 creatinine 0.69\par TSH mildly elevated 6.81\par Vitamin D 30.0\par COVID 19 IgG antibody negative\par Data review October 17, 2019\par CBC normal range\par TFTs normal with TSH 2.64\par Lipid profile cholesterol 230 HDL 88 \par Triglycerides 114\par Serum electrolytes normal\par Liver function testing normal\par Hemoglobin A1c 6.0% with mean plasma glucose 126\par Addendum October 18, 2019 vitamin D 23.4\par \par HD Flu 9/14/20\par Prevnair IM 10/12/2020\par \par Chest x-ray PA lateral October 26, 2020\par Normal cardiac size\par Clear lung fields\par Lamination bilateral right and left hemidiaphragm\par No parenchymal infiltrate pleural effusions dominant pulmonary nodules\par Soft tissue bony structures demonstrate some thinning vertebral spine\par Very minimal scoliosis\par Nida mediastinum unremarkable\par Impression clear lungs\par \par

## 2023-04-20 ENCOUNTER — APPOINTMENT (OUTPATIENT)
Dept: OTOLARYNGOLOGY | Facility: CLINIC | Age: 78
End: 2023-04-20

## 2023-05-26 ENCOUNTER — APPOINTMENT (OUTPATIENT)
Dept: PULMONOLOGY | Facility: CLINIC | Age: 78
End: 2023-05-26

## 2023-05-26 ENCOUNTER — RX RENEWAL (OUTPATIENT)
Age: 78
End: 2023-05-26

## 2023-06-06 ENCOUNTER — RX RENEWAL (OUTPATIENT)
Age: 78
End: 2023-06-06

## 2023-06-14 ENCOUNTER — APPOINTMENT (OUTPATIENT)
Dept: PULMONOLOGY | Facility: CLINIC | Age: 78
End: 2023-06-14
Payer: MEDICARE

## 2023-06-14 VITALS — SYSTOLIC BLOOD PRESSURE: 110 MMHG | OXYGEN SATURATION: 96 % | DIASTOLIC BLOOD PRESSURE: 70 MMHG | HEART RATE: 92 BPM

## 2023-06-14 LAB
GLUCOSE BLDC GLUCOMTR-MCNC: 133
HBA1C MFR BLD HPLC: 6.1

## 2023-06-14 PROCEDURE — 94729 DIFFUSING CAPACITY: CPT

## 2023-06-14 PROCEDURE — 82962 GLUCOSE BLOOD TEST: CPT

## 2023-06-14 PROCEDURE — 94727 GAS DIL/WSHOT DETER LNG VOL: CPT

## 2023-06-14 PROCEDURE — ZZZZZ: CPT

## 2023-06-14 PROCEDURE — 83036 HEMOGLOBIN GLYCOSYLATED A1C: CPT | Mod: QW

## 2023-06-14 PROCEDURE — 94010 BREATHING CAPACITY TEST: CPT

## 2023-06-14 PROCEDURE — 99214 OFFICE O/P EST MOD 30 MIN: CPT | Mod: 25

## 2023-06-14 NOTE — REVIEW OF SYSTEMS
[Hypertension] : ~T hypertension [Arthralgias] : arthralgias [Negative] : Psychiatric [Headache] : no headache [Dizziness] : no dizziness [Focal Weakness] : no focal weakness [Numbness] : no numbness [Paralysis] : no paralysis was seen [FreeTextEntry9] : IAN [FreeTextEntry6] : OA [de-identified] : Hypothyroidism, glucose intolerance

## 2023-06-14 NOTE — PROCEDURE
[FreeTextEntry1] : POCT 6/14/23\par  HGBA1C 6.1\par  NIOx  16  ppb WNL 6/145/23\par \par PFT no bronchodilator June 20, 2023\par Very minimal reduction of flow rates with a very mild obstructive ventilatory impairment\par \par Lines are normal\par % predicted\par Positive air-trapping RV/TLC ratio 136% predicted\par Diffusion normal 95% predicted\par Overall stable pulmonary physiology with a mild obstructive pattern\par Clinical correlation\par \par [place] 4/14/23\par mild OAD with mild  reduction flow  rates\par \par [place] 3/15/23\par  Mild OAD \par interval improvement at flow  rates\par \par POCT 2/1/23\par HGBA1C 6.3\par \par EKG October 20, 2022\par Normal sinus rhythm\par RSR nondiagnostic\par Combined atrial enlargement\par No acute changes\par \par Chest x-ray PA lateral October 28, 2022\par Normal cardiac size\par Lung fields are clear\par No parenchymal infiltrates pleural effusions or dominant pulmonary nodules\par Soft tissue bony structures otherwise unremarkable\par Impression clear lung\par \par POCT 7/7/22\par Glucose 103\par HGBA1C 6.4\par \par [place] 7/7/22\par mild reduction flow rates Mild OAD\par NIOX  19  ppb WNL\par \par POCT 4/18/22\par Glucose 103\par HGBA1C 5.7\par \par POCT 1/10/22\par  Glucose  89\par  HGBA1C 6.1\par \par Chest x-ray PA lateral 10/4/2021\par Normal cardiac size\par Clear lung fields\par No parenchymal infiltrates pleural effusions or dominant pulmonary nodules\par Mild S-shaped scoliosis\par Impression overall clear lungs\par \par EKG 10/4/2021\par NSR\par  RSR\par \par POCT 6/2/21\par Glucose \par HGB A1C  6.0/average glucose 126 \par \par POCT 11/30/2020\par Glucose   124\par HGBA1C   5.8\par \par POCT 9/14/2020\par Glucose 83\par \par Blood draw\par POCT july 27 2022\par Glucose 90\par \par Data review June 22, 2022\par Glucose 127\par Hemoglobin A1c 6.0\par \par HGBA!C 6.3 1/2/2022\par \par Chest x-ray PA lateral October 17, 2019\par Cardiac size normal\par Clear lung fields without parenchymal infiltrates pleural effusions dominant pulmonary nodules\par Soft tissue bony structures demonstrate some thinning of the vertebral spine\par Be distended hilum normal\par Impression clear lungs\par \par EKG NSR RSR Oct  12  2020\par NSSST \par Likely  normal  for  age\par \par Blood  draw\par \par DATA review 9/14/20\par T4 normal 6.5\par TSH 0.07\par Will change Synthroid dosing 88 MCG to 6 days/week\par Data review June 22, 2020\par Lipid profile\par Cholesterol 207\par \par HDL 79\par Triglycerides 126\par Liver function panel normal range\par Serum glucose 136\par Serum electrolytes normal\par BUN 16 creatinine 0.69\par TSH mildly elevated 6.81\par Vitamin D 30.0\par COVID 19 IgG antibody negative\par Data review October 17, 2019\par CBC normal range\par TFTs normal with TSH 2.64\par Lipid profile cholesterol 230 HDL 88 \par Triglycerides 114\par Serum electrolytes normal\par Liver function testing normal\par Hemoglobin A1c 6.0% with mean plasma glucose 126\par Addendum October 18, 2019 vitamin D 23.4\par \par HD Flu 9/14/20\par Prevnair IM 10/12/2020\par \par Chest x-ray PA lateral October 26, 2020\par Normal cardiac size\par Clear lung fields\par Lamination bilateral right and left hemidiaphragm\par No parenchymal infiltrate pleural effusions dominant pulmonary nodules\par Soft tissue bony structures demonstrate some thinning vertebral spine\par Very minimal scoliosis\par Nida mediastinum unremarkable\par Impression clear lungs\par \par

## 2023-07-26 ENCOUNTER — APPOINTMENT (OUTPATIENT)
Dept: PULMONOLOGY | Facility: CLINIC | Age: 78
End: 2023-07-26
Payer: MEDICARE

## 2023-07-26 VITALS — OXYGEN SATURATION: 96 % | SYSTOLIC BLOOD PRESSURE: 114 MMHG | HEART RATE: 85 BPM | DIASTOLIC BLOOD PRESSURE: 70 MMHG

## 2023-07-26 DIAGNOSIS — R06.2 WHEEZING: ICD-10-CM

## 2023-07-26 PROCEDURE — 36415 COLL VENOUS BLD VENIPUNCTURE: CPT

## 2023-07-26 PROCEDURE — 94060 EVALUATION OF WHEEZING: CPT

## 2023-07-26 PROCEDURE — 99214 OFFICE O/P EST MOD 30 MIN: CPT | Mod: 25

## 2023-07-26 PROCEDURE — 95012 NITRIC OXIDE EXP GAS DETER: CPT

## 2023-07-26 NOTE — REVIEW OF SYSTEMS
[Hypertension] : ~T hypertension [Arthralgias] : arthralgias [Negative] : Psychiatric [Headache] : no headache [Dizziness] : no dizziness [Focal Weakness] : no focal weakness [Numbness] : no numbness [Paralysis] : no paralysis was seen [FreeTextEntry9] : IAN [FreeTextEntry6] : OA [de-identified] : Hypothyroidism, glucose intolerance

## 2023-07-26 NOTE — PROCEDURE
[FreeTextEntry1] : NIOX 11 ppb nl range 7/26/23\par  [place] 7/26/23\par  Mild OAd but no decline \par \par POCT 6/14/23\par  HGBA1C 6.1\par  NIOx  16  ppb WNL 6/145/23\par \par PFT no bronchodilator June 20, 2023\par Very minimal reduction of flow rates with a very mild obstructive ventilatory impairment\par Lung volumes are normal\par % predicted\par Positive air-trapping RV/TLC ratio 136% predicted\par Diffusion normal 95% predicted\par Overall stable pulmonary physiology with a mild obstructive pattern\par Clinical correlation\par \par [place] 4/14/23\par mild OAD with mild  reduction flow  rates\par \par [place] 3/15/23\par  Mild OAD \par interval improvement at flow  rates\par \par POCT 2/1/23\par HGBA1C 6.3\par \par EKG October 20, 2022\par Normal sinus rhythm\par RSR nondiagnostic\par Combined atrial enlargement\par No acute changes\par \par Chest x-ray PA lateral October 28, 2022\par Normal cardiac size\par Lung fields are clear\par No parenchymal infiltrates pleural effusions or dominant pulmonary nodules\par Soft tissue bony structures otherwise unremarkable\par Impression clear lung\par \par POCT 7/7/22\par Glucose 103\par HGBA1C 6.4\par \par [place] 7/7/22\par mild reduction flow rates Mild OAD\par NIOX  19  ppb WNL\par \par POCT 4/18/22\par Glucose 103\par HGBA1C 5.7\par \par POCT 1/10/22\par  Glucose  89\par  HGBA1C 6.1\par \par Chest x-ray PA lateral 10/4/2021\par Normal cardiac size\par Clear lung fields\par No parenchymal infiltrates pleural effusions or dominant pulmonary nodules\par Mild S-shaped scoliosis\par Impression overall clear lungs\par \par EKG 10/4/2021\par NSR\par  RSR\par \par POCT 6/2/21\par Glucose \par HGB A1C  6.0/average glucose 126 \par \par POCT 11/30/2020\par Glucose   124\par HGBA1C   5.8\par \par POCT 9/14/2020\par Glucose 83\par \par Blood draw\par POCT july 27 2022\par Glucose 90\par \par Data review June 22, 2022\par Glucose 127\par Hemoglobin A1c 6.0\par \par HGBA!C 6.3 1/2/2022\par \par Chest x-ray PA lateral October 17, 2019\par Cardiac size normal\par Clear lung fields without parenchymal infiltrates pleural effusions dominant pulmonary nodules\par Soft tissue bony structures demonstrate some thinning of the vertebral spine\par Be distended hilum normal\par Impression clear lungs\par \par EKG NSR RSR Oct  12  2020\par NSSST \par Likely  normal  for  age\par \par Blood  draw\par \par DATA review 9/14/20\par T4 normal 6.5\par TSH 0.07\par Will change Synthroid dosing 88 MCG to 6 days/week\par Data review June 22, 2020\par Lipid profile\par Cholesterol 207\par \par HDL 79\par Triglycerides 126\par Liver function panel normal range\par Serum glucose 136\par Serum electrolytes normal\par BUN 16 creatinine 0.69\par \par Chest x-ray PA lateral October 26, 2020\par Normal cardiac size\par Clear lung fields\par Lamination bilateral right and left hemidiaphragm\par No parenchymal infiltrate pleural effusions dominant pulmonary nodules\par Soft tissue bony structures demonstrate some thinning vertebral spine\par Very minimal scoliosis\par Nida mediastinum unremarkable\par Impression clear lungs\par \par Blood draw

## 2023-07-26 NOTE — DISCUSSION/SUMMARY
[FreeTextEntry1] : Well visit completed October 2022\par allergic  rhinitis\par GYN noted\par PNDS\par OAD  faint wheeze Not  active and stable NIOX and spirometry\par HTN- stable\par HLD\par Hypothyroidism\par Hypercholesterolemia\par PreDM- better dietary control addressed Trend Q 3 mos A1C\par Mild diastolic dysfunction\par Chronic osteoarthritis\par Mild OAD  ASX without decline at flow  rates\par Osteoporosis screening/ per GYN\par Continue Norvasc 5 mg\par Losartan 100  mg\par I change dosing to 6 days/week Synthroid 88 MCG 1 tablet p.o. daily-recheck thyroid function testing next visit- \par  \par Avoid ACE inhibitor since patient has an ACE induced cough\par Low sugar diet\par Avoid carbohydrates\par \par Follow-up status of mammogram Completed  noted  management GYN\par Due colonoscopy-  Dennis Burns MD re recommended\par \par GYN DEXA and Mammogram  management    up  to  date\par Vaccination profile up-to-date\par Completed  10 2022 COVID Bi valent vaccine

## 2023-07-27 ENCOUNTER — NON-APPOINTMENT (OUTPATIENT)
Age: 78
End: 2023-07-27

## 2023-07-27 LAB
ANION GAP SERPL CALC-SCNC: 13 MMOL/L
BUN SERPL-MCNC: 14 MG/DL
CALCIUM SERPL-MCNC: 9 MG/DL
CHLORIDE SERPL-SCNC: 106 MMOL/L
CHOLEST SERPL-MCNC: 156 MG/DL
CO2 SERPL-SCNC: 20 MMOL/L
CREAT SERPL-MCNC: 0.64 MG/DL
EGFR: 90 ML/MIN/1.73M2
GLUCOSE SERPL-MCNC: 128 MG/DL
HDLC SERPL-MCNC: 80 MG/DL
LDLC SERPL CALC-MCNC: 61 MG/DL
NONHDLC SERPL-MCNC: 76 MG/DL
POTASSIUM SERPL-SCNC: 4.1 MMOL/L
SODIUM SERPL-SCNC: 140 MMOL/L
TRIGL SERPL-MCNC: 80 MG/DL
TSH SERPL-ACNC: 0.72 UIU/ML

## 2023-09-01 ENCOUNTER — RX RENEWAL (OUTPATIENT)
Age: 78
End: 2023-09-01

## 2023-09-13 ENCOUNTER — APPOINTMENT (OUTPATIENT)
Dept: PULMONOLOGY | Facility: CLINIC | Age: 78
End: 2023-09-13

## 2023-09-22 ENCOUNTER — APPOINTMENT (OUTPATIENT)
Dept: PULMONOLOGY | Facility: CLINIC | Age: 78
End: 2023-09-22
Payer: MEDICARE

## 2023-09-22 VITALS — OXYGEN SATURATION: 99 % | SYSTOLIC BLOOD PRESSURE: 127 MMHG | HEART RATE: 81 BPM | DIASTOLIC BLOOD PRESSURE: 72 MMHG

## 2023-09-22 DIAGNOSIS — Z23 ENCOUNTER FOR IMMUNIZATION: ICD-10-CM

## 2023-09-22 PROCEDURE — 90662 IIV NO PRSV INCREASED AG IM: CPT

## 2023-09-22 PROCEDURE — 36415 COLL VENOUS BLD VENIPUNCTURE: CPT

## 2023-09-22 PROCEDURE — G0008: CPT

## 2023-09-22 PROCEDURE — 99214 OFFICE O/P EST MOD 30 MIN: CPT | Mod: 25

## 2023-09-23 ENCOUNTER — NON-APPOINTMENT (OUTPATIENT)
Age: 78
End: 2023-09-23

## 2023-09-23 LAB
ANION GAP SERPL CALC-SCNC: 12 MMOL/L
BUN SERPL-MCNC: 12 MG/DL
CALCIUM SERPL-MCNC: 9.7 MG/DL
CHLORIDE SERPL-SCNC: 104 MMOL/L
CHOLEST SERPL-MCNC: 162 MG/DL
CO2 SERPL-SCNC: 25 MMOL/L
CREAT SERPL-MCNC: 0.57 MG/DL
EGFR: 92 ML/MIN/1.73M2
GLUCOSE SERPL-MCNC: 99 MG/DL
HDLC SERPL-MCNC: 84 MG/DL
LDLC SERPL CALC-MCNC: 64 MG/DL
NONHDLC SERPL-MCNC: 77 MG/DL
POTASSIUM SERPL-SCNC: 4.5 MMOL/L
SODIUM SERPL-SCNC: 141 MMOL/L
TRIGL SERPL-MCNC: 65 MG/DL
TSH SERPL-ACNC: 0.6 UIU/ML

## 2023-10-27 ENCOUNTER — NON-APPOINTMENT (OUTPATIENT)
Age: 78
End: 2023-10-27

## 2023-10-27 ENCOUNTER — APPOINTMENT (OUTPATIENT)
Dept: PULMONOLOGY | Facility: CLINIC | Age: 78
End: 2023-10-27
Payer: MEDICARE

## 2023-10-27 VITALS — HEART RATE: 88 BPM | OXYGEN SATURATION: 96 % | DIASTOLIC BLOOD PRESSURE: 88 MMHG | SYSTOLIC BLOOD PRESSURE: 115 MMHG

## 2023-10-27 DIAGNOSIS — R92.30 INCONCLUSIVE MAMMOGRAM: ICD-10-CM

## 2023-10-27 DIAGNOSIS — R82.90 UNSPECIFIED ABNORMAL FINDINGS IN URINE: ICD-10-CM

## 2023-10-27 DIAGNOSIS — M81.0 AGE-RELATED OSTEOPOROSIS W/OUT CURRENT PATHOLOGICAL FRACTURE: ICD-10-CM

## 2023-10-27 DIAGNOSIS — M85.80 OTHER SPECIFIED DISORDERS OF BONE DENSITY AND STRUCTURE, UNSPECIFIED SITE: ICD-10-CM

## 2023-10-27 DIAGNOSIS — Z13.820 ENCOUNTER FOR SCREENING FOR OSTEOPOROSIS: ICD-10-CM

## 2023-10-27 DIAGNOSIS — R92.2 INCONCLUSIVE MAMMOGRAM: ICD-10-CM

## 2023-10-27 LAB
BILIRUB UR QL STRIP: NEGATIVE
CLARITY UR: CLEAR
COLLECTION METHOD: NORMAL
GLUCOSE UR-MCNC: NEGATIVE
HCG UR QL: 0.2 EU/DL
HGB UR QL STRIP.AUTO: NORMAL
KETONES UR-MCNC: NEGATIVE
LEUKOCYTE ESTERASE UR QL STRIP: NEGATIVE
NITRITE UR QL STRIP: NEGATIVE
PH UR STRIP: 5.5
PROT UR STRIP-MCNC: NEGATIVE
SP GR UR STRIP: 1.01

## 2023-10-27 PROCEDURE — 77085 DXA BONE DENSITY AXL VRT FX: CPT

## 2023-10-27 PROCEDURE — 36415 COLL VENOUS BLD VENIPUNCTURE: CPT

## 2023-10-27 PROCEDURE — 93000 ELECTROCARDIOGRAM COMPLETE: CPT

## 2023-10-27 PROCEDURE — 81003 URINALYSIS AUTO W/O SCOPE: CPT | Mod: QW

## 2023-10-27 PROCEDURE — 71046 X-RAY EXAM CHEST 2 VIEWS: CPT

## 2023-10-27 PROCEDURE — 99214 OFFICE O/P EST MOD 30 MIN: CPT | Mod: 25

## 2023-11-30 ENCOUNTER — RX RENEWAL (OUTPATIENT)
Age: 78
End: 2023-11-30

## 2023-12-07 ENCOUNTER — APPOINTMENT (OUTPATIENT)
Dept: ULTRASOUND IMAGING | Facility: CLINIC | Age: 78
End: 2023-12-07
Payer: MEDICARE

## 2023-12-07 ENCOUNTER — RESULT REVIEW (OUTPATIENT)
Age: 78
End: 2023-12-07

## 2023-12-07 ENCOUNTER — APPOINTMENT (OUTPATIENT)
Dept: MAMMOGRAPHY | Facility: CLINIC | Age: 78
End: 2023-12-07
Payer: MEDICARE

## 2023-12-07 PROCEDURE — 77063 BREAST TOMOSYNTHESIS BI: CPT

## 2023-12-07 PROCEDURE — 77067 SCR MAMMO BI INCL CAD: CPT

## 2023-12-07 PROCEDURE — 76641 ULTRASOUND BREAST COMPLETE: CPT | Mod: 50

## 2023-12-08 ENCOUNTER — NON-APPOINTMENT (OUTPATIENT)
Age: 78
End: 2023-12-08

## 2024-01-15 ENCOUNTER — NON-APPOINTMENT (OUTPATIENT)
Age: 79
End: 2024-01-15

## 2024-01-15 ENCOUNTER — APPOINTMENT (OUTPATIENT)
Dept: PULMONOLOGY | Facility: CLINIC | Age: 79
End: 2024-01-15
Payer: MEDICARE

## 2024-01-15 VITALS — SYSTOLIC BLOOD PRESSURE: 121 MMHG | OXYGEN SATURATION: 99 % | HEART RATE: 95 BPM | DIASTOLIC BLOOD PRESSURE: 67 MMHG

## 2024-01-15 DIAGNOSIS — Z00.00 ENCOUNTER FOR GENERAL ADULT MEDICAL EXAMINATION W/OUT ABNORMAL FINDINGS: ICD-10-CM

## 2024-01-15 LAB
ANION GAP SERPL CALC-SCNC: 13 MMOL/L
BUN SERPL-MCNC: 15 MG/DL
CALCIUM SERPL-MCNC: 9.3 MG/DL
CHLORIDE SERPL-SCNC: 107 MMOL/L
CHOLEST SERPL-MCNC: 169 MG/DL
CO2 SERPL-SCNC: 24 MMOL/L
CREAT SERPL-MCNC: 0.67 MG/DL
EGFR: 89 ML/MIN/1.73M2
ESTIMATED AVERAGE GLUCOSE: 131 MG/DL
GLUCOSE SERPL-MCNC: 123 MG/DL
HBA1C MFR BLD HPLC: 6.2 %
HDLC SERPL-MCNC: 90 MG/DL
LDLC SERPL CALC-MCNC: 67 MG/DL
NONHDLC SERPL-MCNC: 79 MG/DL
POTASSIUM SERPL-SCNC: 4.3 MMOL/L
SODIUM SERPL-SCNC: 144 MMOL/L
TRIGL SERPL-MCNC: 63 MG/DL
TSH SERPL-ACNC: 0.84 UIU/ML

## 2024-01-15 PROCEDURE — 99214 OFFICE O/P EST MOD 30 MIN: CPT | Mod: 25

## 2024-01-15 PROCEDURE — 36415 COLL VENOUS BLD VENIPUNCTURE: CPT

## 2024-01-15 PROCEDURE — 94060 EVALUATION OF WHEEZING: CPT

## 2024-01-15 PROCEDURE — 95012 NITRIC OXIDE EXP GAS DETER: CPT

## 2024-01-15 NOTE — HISTORY OF PRESENT ILLNESS
[Difficulty Breathing During Exertion] : denies dyspnea on exertion [Feelings Of Weakness On Exertion] : denies exercise intolerance [Cough] : denies coughing [Wheezing] : denies wheezing [Regional Soft Tissue Swelling Both Lower Extremities] : denies lower extremity edema [Chest Pain Or Discomfort] : denies chest pain [Fever] : denies fever [Former] : is a former smoker [FreeTextEntry1] : CPE October 27, 2023 with review prior data imaging laboratory studies that are up to date  completed GYN completed No active complaints Does have some poor sleep hygiene but that is because of her  with Parkinson's disease  Oral dental issues with gum disease ? thrush txed with rinse completed Nystatin  dizzy lightheaded Neuro- Brain MRI February 21, 2022 Multiple white matter hypersensitivities nonspecific most consistent with chronic microvascular ischemic disease Mild global cerebral volume loss likely consistent with age No acute infarction No reported tumors hemorrhage no palpitations no chest pain describes facial distortion  Weight stable Appetite normal Denies any chronic fatigue No respiratory symptomatology No chest pain exertional chest pain No lower extremity edema No active urinary symptoms frequency DDS ongoing  management

## 2024-01-15 NOTE — PHYSICAL EXAM
[General Appearance - Well Developed] : well developed [Normal Appearance] : normal appearance [Well Groomed] : well groomed [General Appearance - Well Nourished] : well nourished [No Deformities] : no deformities [General Appearance - In No Acute Distress] : no acute distress [Normal Conjunctiva] : the conjunctiva exhibited no abnormalities [Eyelids - No Xanthelasma] : the eyelids demonstrated no xanthelasmas [Normal Oropharynx] : normal oropharynx [Neck Appearance] : the appearance of the neck was normal [Neck Cervical Mass (___cm)] : no neck mass was observed [Jugular Venous Distention Increased] : there was no jugular-venous distention [Thyroid Diffuse Enlargement] : the thyroid was not enlarged [Heart Rate And Rhythm] : heart rate and rhythm were normal [Heart Sounds] : normal S1 and S2 [Murmurs] : no murmurs present [Respiration, Rhythm And Depth] : normal respiratory rhythm and effort [Exaggerated Use Of Accessory Muscles For Inspiration] : no accessory muscle use [Chest Palpation] : palpation of the chest revealed no abnormalities [Lungs Percussion] : the lungs were normal to percussion [Abdomen Soft] : soft [Abdomen Tenderness] : non-tender [Abdomen Mass (___ Cm)] : no abdominal mass palpated [Abnormal Walk] : normal gait [Nail Clubbing] : no clubbing of the fingernails [Cyanosis, Localized] : no localized cyanosis [Petechial Hemorrhages (___cm)] : no petechial hemorrhages [] : no ischemic changes [Deep Tendon Reflexes (DTR)] : deep tendon reflexes were 2+ and symmetric [Sensation] : the sensory exam was normal to light touch and pinprick [No Focal Deficits] : no focal deficits [Impaired Insight] : insight and judgment were intact [Oriented To Time, Place, And Person] : oriented to person, place, and time [Affect] : the affect was normal [FreeTextEntry1] : healed shinges over right buttock

## 2024-01-15 NOTE — REVIEW OF SYSTEMS
[Hypertension] : ~T hypertension [Arthralgias] : arthralgias [Negative] : Psychiatric [Headache] : no headache [Dizziness] : no dizziness [Focal Weakness] : no focal weakness [Numbness] : no numbness [Paralysis] : no paralysis was seen [FreeTextEntry9] : IAN [de-identified] : Hypothyroidism, glucose intolerance [FreeTextEntry6] : OA

## 2024-01-15 NOTE — PROCEDURE
[FreeTextEntry1] : NIOX  15  ppb WNl  1/15/24 Spirometry January 15, 2024 Flow rates are normal No bronchodilator Significant interval improvement compared to July 2023   Bone density DEXA scan October 27, 2023 AP spine L1-L4 osteopenia Left femoral neck osteoporosis Total left hip osteoporosis Weightbearing walking exercise  Chest x-ray PA lateral 1 year follow-up October 27, 2023 Cardiac size normal Mild scoliosis Lung fields are clear No parenchymal infiltrate pleural effusion dominant pulmonary nodule Calcification of the trachea Overall clear lungs Addendum cannot exclude some mucoid impaction bronchiectatic at the right lower lung zone  EKG 10/27/23  NSR   NIOX 11 ppb nl range 7/26/23  [place] 7/26/23  Mild OAd but no decline   POCT 6/14/23  HGBA1C 6.1  NIOx  16  ppb WNL 6/145/23  PFT no bronchodilator June 20, 2023 Very minimal reduction of flow rates with a very mild obstructive ventilatory impairment Lung volumes are normal % predicted Positive air-trapping RV/TLC ratio 136% predicted Diffusion normal 95% predicted Overall stable pulmonary physiology with a mild obstructive pattern Clinical correlation  [place] 4/14/23 mild OAD with mild  reduction flow  rates  [place] 3/15/23  Mild OAD  interval improvement at flow  rates  POCT 2/1/23 HGBA1C 6.3  EKG October 20, 2022 Normal sinus rhythm RSR nondiagnostic Combined atrial enlargement No acute changes  Chest x-ray PA lateral October 28, 2022 Normal cardiac size Lung fields are clear No parenchymal infiltrates pleural effusions or dominant pulmonary nodules Soft tissue bony structures otherwise unremarkable Impression clear lung  POCT 7/7/22 Glucose 103 HGBA1C 6.4  [place] 7/7/22 mild reduction flow rates Mild OAD NIOX  19  ppb WNL  POCT 4/18/22 Glucose 103 HGBA1C 5.7  POCT 1/10/22  Glucose  89  HGBA1C 6.1  Chest x-ray PA lateral 10/4/2021 Normal cardiac size Clear lung fields No parenchymal infiltrates pleural effusions or dominant pulmonary nodules Mild S-shaped scoliosis Impression overall clear lungs  EKG 10/4/2021 NSR  RSR  POCT 6/2/21 Glucose  HGB A1C  6.0/average glucose 126   POCT 11/30/2020 Glucose   124 HGBA1C   5.8  POCT 9/14/2020 Glucose 83  Blood draw POCT july 27 2022 Glucose 90  Data review June 22, 2022 Glucose 127 Hemoglobin A1c 6.0  HGBA!C 6.3 1/2/2022  Chest x-ray PA lateral October 17, 2019 Cardiac size normal Clear lung fields without parenchymal infiltrates pleural effusions dominant pulmonary nodules Soft tissue bony structures demonstrate some thinning of the vertebral spine Be distended hilum normal Impression clear lungs  EKG NSR RSR Oct  12  2020 NSSST  Likely  normal  for  age  Blood  draw  DATA review 9/14/20 T4 normal 6.5 TSH 0.07 Will change Synthroid dosing 88 MCG to 6 days/week Data review June 22, 2020 Lipid profile Cholesterol 207  HDL 79 Triglycerides 126 Liver function panel normal range Serum glucose 136 Serum electrolytes normal BUN 16 creatinine 0.69  Chest x-ray PA lateral October 26, 2020 Normal cardiac size Clear lung fields Lamination bilateral right and left hemidiaphragm No parenchymal infiltrate pleural effusions dominant pulmonary nodules Soft tissue bony structures demonstrate some thinning vertebral spine Very minimal scoliosis Nida mediastinum unremarkable Impression clear lungs  Blood draw

## 2024-01-15 NOTE — DISCUSSION/SUMMARY
[FreeTextEntry1] : Well visit completed July 2023 allergic  rhinitis not  active new Dx osteoporsis-  generic Boniva  monthly  with detailed instruction f/u DEX 1- 2 years GYN noted PNDS OAD  faint wheeze Not  active and stable NIOX and spirometry HTN- stable HLD Hypothyroidism Hypercholesterolemia PreDM- better dietary control addressed Trend Q 3 mos A1C Mild diastolic dysfunction Chronic osteoarthritis Mild OAD  ASX without decline at flow  rates Osteoporosis screening/ per GYN Continue Norvasc 5 mg Losartan 100  mg I change dosing to 6 days/week Synthroid 88 MCG 1 tablet p.o. daily-recheck thyroid function testing next visit-    Avoid ACE inhibitor since patient has an ACE induced cough Low sugar diet Avoid carbohydrates  Follow-up status of mammogram Completed  noted  management GYN Due colonoscopy-  Dennis Burns MD re recommended  GYN DEXA and Mammogram  management    up  to  date Vaccination profile up-to-date Completed  10 2022 COVID Bi valent vaccine Completed a new updated COVID-vaccine Believes she also received RSV vaccine It Mucinex DM for chest congestion and cough Noted interval improvement of pulmonary physiology and Follow-up 6 weeks Blood work pending

## 2024-02-26 ENCOUNTER — APPOINTMENT (OUTPATIENT)
Dept: PULMONOLOGY | Facility: CLINIC | Age: 79
End: 2024-02-26
Payer: MEDICARE

## 2024-02-26 VITALS — DIASTOLIC BLOOD PRESSURE: 70 MMHG | HEART RATE: 89 BPM | SYSTOLIC BLOOD PRESSURE: 109 MMHG | OXYGEN SATURATION: 97 %

## 2024-02-26 DIAGNOSIS — M19.90 UNSPECIFIED OSTEOARTHRITIS, UNSPECIFIED SITE: ICD-10-CM

## 2024-02-26 DIAGNOSIS — I51.9 HEART DISEASE, UNSPECIFIED: ICD-10-CM

## 2024-02-26 PROCEDURE — 99213 OFFICE O/P EST LOW 20 MIN: CPT

## 2024-02-26 PROCEDURE — 36415 COLL VENOUS BLD VENIPUNCTURE: CPT

## 2024-02-26 NOTE — HISTORY OF PRESENT ILLNESS
[Difficulty Breathing During Exertion] : denies dyspnea on exertion [Feelings Of Weakness On Exertion] : denies exercise intolerance [Regional Soft Tissue Swelling Both Lower Extremities] : denies lower extremity edema [Wheezing] : denies wheezing [Cough] : denies coughing [Fever] : denies fever [Chest Pain Or Discomfort] : denies chest pain [Former] : is a former smoker [FreeTextEntry1] : CPE October 27, 2023 with review prior data imaging laboratory studies that are up to date  completed GYN completed No active complaints Does have some poor sleep hygiene but that is because of her  with Parkinson's disease  Oral dental issues with gum disease ? thrush txed with rinse completed Nystatin  dizzy lightheaded Neuro- Brain MRI February 21, 2022 Multiple white matter hypersensitivities nonspecific most consistent with chronic microvascular ischemic disease Mild global cerebral volume loss likely consistent with age No acute infarction No reported tumors hemorrhage no palpitations no chest pain describes facial distortion  Weight stable Appetite normal Denies any chronic fatigue No respiratory symptomatology No chest pain exertional chest pain No lower extremity edema No active urinary symptoms frequency DDS ongoing  management

## 2024-02-26 NOTE — PHYSICAL EXAM
[General Appearance - Well Developed] : well developed [General Appearance - Well Nourished] : well nourished [Well Groomed] : well groomed [Normal Appearance] : normal appearance [No Deformities] : no deformities [General Appearance - In No Acute Distress] : no acute distress [Normal Conjunctiva] : the conjunctiva exhibited no abnormalities [Normal Oropharynx] : normal oropharynx [Eyelids - No Xanthelasma] : the eyelids demonstrated no xanthelasmas [Neck Cervical Mass (___cm)] : no neck mass was observed [Neck Appearance] : the appearance of the neck was normal [Jugular Venous Distention Increased] : there was no jugular-venous distention [Thyroid Diffuse Enlargement] : the thyroid was not enlarged [Heart Rate And Rhythm] : heart rate and rhythm were normal [Heart Sounds] : normal S1 and S2 [Murmurs] : no murmurs present [Exaggerated Use Of Accessory Muscles For Inspiration] : no accessory muscle use [Respiration, Rhythm And Depth] : normal respiratory rhythm and effort [Chest Palpation] : palpation of the chest revealed no abnormalities [Lungs Percussion] : the lungs were normal to percussion [Abdomen Soft] : soft [Abdomen Tenderness] : non-tender [Abdomen Mass (___ Cm)] : no abdominal mass palpated [Abnormal Walk] : normal gait [Nail Clubbing] : no clubbing of the fingernails [Cyanosis, Localized] : no localized cyanosis [] : no ischemic changes [Petechial Hemorrhages (___cm)] : no petechial hemorrhages [FreeTextEntry1] : healed shinges over right buttock [Deep Tendon Reflexes (DTR)] : deep tendon reflexes were 2+ and symmetric [Sensation] : the sensory exam was normal to light touch and pinprick [No Focal Deficits] : no focal deficits [Oriented To Time, Place, And Person] : oriented to person, place, and time [Impaired Insight] : insight and judgment were intact [Affect] : the affect was normal

## 2024-02-26 NOTE — REVIEW OF SYSTEMS
[Hypertension] : ~T hypertension [Arthralgias] : arthralgias [Headache] : no headache [Focal Weakness] : no focal weakness [Dizziness] : no dizziness [Paralysis] : no paralysis was seen [Numbness] : no numbness [Negative] : Psychiatric [FreeTextEntry9] : IAN [FreeTextEntry6] : OA [de-identified] : Hypothyroidism, glucose intolerance

## 2024-02-26 NOTE — PROCEDURE
[FreeTextEntry1] : NIOX  15  ppb WNl  1/15/24 Spirometry January 15, 2024 Flow rates are normal No bronchodilator Significant interval improvement compared to July 2023   Bone density DEXA scan October 27, 2023 AP spine L1-L4 osteopenia Left femoral neck osteoporosis Total left hip osteoporosis Weightbearing walking exercise  Chest x-ray PA lateral 1 year follow-up October 27, 2023 Cardiac size normal Mild scoliosis Lung fields are clear No parenchymal infiltrate pleural effusion dominant pulmonary nodule Calcification of the trachea Overall clear lungs Addendum cannot exclude some mucoid impaction bronchiectatic at the right lower lung zone  EKG 10/27/23  NSR   NIOX 11 ppb nl range 7/26/23  [place] 7/26/23  Mild OAd but no decline   POCT 6/14/23  HGBA1C 6.1  NIOx  16  ppb WNL 6/145/23  PFT no bronchodilator June 20, 2023 Very minimal reduction of flow rates with a very mild obstructive ventilatory impairment Lung volumes are normal % predicted Positive air-trapping RV/TLC ratio 136% predicted Diffusion normal 95% predicted Overall stable pulmonary physiology with a mild obstructive pattern Clinical correlation  [place] 4/14/23 mild OAD with mild  reduction flow  rates  [place] 3/15/23  Mild OAD  interval improvement at flow  rates  POCT 2/1/23 HGBA1C 6.3  EKG October 20, 2022 Normal sinus rhythm RSR nondiagnostic Combined atrial enlargement No acute changes  Chest x-ray PA lateral October 28, 2022 Normal cardiac size Lung fields are clear No parenchymal infiltrates pleural effusions or dominant pulmonary nodules Soft tissue bony structures otherwise unremarkable Impression clear lung  POCT 7/7/22 Glucose 103 HGBA1C 6.4  [place] 7/7/22 mild reduction flow rates Mild OAD NIOX  19  ppb WNL  POCT 4/18/22 Glucose 103 HGBA1C 5.7  POCT 1/10/22  Glucose  89  HGBA1C 6.1  Chest x-ray PA lateral 10/4/2021 Normal cardiac size Clear lung fields No parenchymal infiltrates pleural effusions or dominant pulmonary nodules Mild S-shaped scoliosis Impression overall clear lungs  EKG 10/4/2021 NSR  RSR  POCT 6/2/21 Glucose  HGB A1C  6.0/average glucose 126   POCT 11/30/2020 Glucose   124 HGBA1C   5.8  POCT 9/14/2020 Glucose 83  Blood draw POCT july 27 2022 Glucose 90  Data review June 22, 2022 Glucose 127 Hemoglobin A1c 6.0  HGBA!C 6.3 1/2/2022  Chest x-ray PA lateral October 17, 2019 Cardiac size normal Clear lung fields without parenchymal infiltrates pleural effusions dominant pulmonary nodules Soft tissue bony structures demonstrate some thinning of the vertebral spine Be distended hilum normal Impression clear lungs  EKG NSR RSR Oct  12  2020 NSSST  Likely  normal  for  age  Blood  draw  DATA review 9/14/20 T4 normal 6.5 TSH 0.07 Will change Synthroid dosing 88 MCG to 6 days/week Data review June 22, 2020 Lipid profile Cholesterol 207  HDL 79 Triglycerides 126 Liver function panel normal range Serum glucose 136 Serum electrolytes normal BUN 16 creatinine 0.69  Chest x-ray PA lateral October 26, 2020 Normal cardiac size Clear lung fields Lamination bilateral right and left hemidiaphragm No parenchymal infiltrate pleural effusions dominant pulmonary nodules Soft tissue bony structures demonstrate some thinning vertebral spine Very minimal scoliosis Nida mediastinum unremarkable Impression clear lungs  Blood draw

## 2024-02-27 ENCOUNTER — NON-APPOINTMENT (OUTPATIENT)
Age: 79
End: 2024-02-27

## 2024-02-27 LAB
ANION GAP SERPL CALC-SCNC: 9 MMOL/L
BUN SERPL-MCNC: 8 MG/DL
CALCIUM SERPL-MCNC: 9.2 MG/DL
CHLORIDE SERPL-SCNC: 105 MMOL/L
CO2 SERPL-SCNC: 26 MMOL/L
CREAT SERPL-MCNC: 0.6 MG/DL
EGFR: 91 ML/MIN/1.73M2
GLUCOSE SERPL-MCNC: 111 MG/DL
POTASSIUM SERPL-SCNC: 4.7 MMOL/L
SODIUM SERPL-SCNC: 139 MMOL/L

## 2024-03-29 ENCOUNTER — RX RENEWAL (OUTPATIENT)
Age: 79
End: 2024-03-29

## 2024-03-29 RX ORDER — PRAVASTATIN SODIUM 80 MG/1
80 TABLET ORAL DAILY
Qty: 90 | Refills: 0 | Status: ACTIVE | COMMUNITY
Start: 2018-09-28 | End: 1900-01-01

## 2024-03-29 RX ORDER — LOSARTAN POTASSIUM 50 MG/1
50 TABLET, FILM COATED ORAL
Qty: 90 | Refills: 0 | Status: ACTIVE | COMMUNITY
Start: 2018-10-14 | End: 1900-01-01

## 2024-03-29 RX ORDER — LEVOTHYROXINE SODIUM 0.09 MG/1
88 TABLET ORAL
Qty: 90 | Refills: 0 | Status: ACTIVE | COMMUNITY
Start: 2020-06-22 | End: 1900-01-01

## 2024-03-29 RX ORDER — EZETIMIBE 10 MG/1
10 TABLET ORAL
Qty: 90 | Refills: 0 | Status: ACTIVE | COMMUNITY
Start: 2022-02-18 | End: 1900-01-01

## 2024-04-01 RX ORDER — AMLODIPINE BESYLATE 5 MG/1
5 TABLET ORAL
Qty: 90 | Refills: 0 | Status: ACTIVE | COMMUNITY
Start: 2020-02-10 | End: 1900-01-01

## 2024-04-17 ENCOUNTER — APPOINTMENT (OUTPATIENT)
Dept: PULMONOLOGY | Facility: CLINIC | Age: 79
End: 2024-04-17
Payer: MEDICARE

## 2024-04-17 VITALS
OXYGEN SATURATION: 100 % | HEIGHT: 65 IN | WEIGHT: 99 LBS | SYSTOLIC BLOOD PRESSURE: 144 MMHG | HEART RATE: 93 BPM | BODY MASS INDEX: 16.5 KG/M2 | DIASTOLIC BLOOD PRESSURE: 68 MMHG

## 2024-04-17 VITALS
RESPIRATION RATE: 16 BRPM | BODY MASS INDEX: 16.47 KG/M2 | DIASTOLIC BLOOD PRESSURE: 68 MMHG | OXYGEN SATURATION: 100 % | SYSTOLIC BLOOD PRESSURE: 144 MMHG | HEART RATE: 93 BPM | WEIGHT: 99 LBS

## 2024-04-17 DIAGNOSIS — Z01.812 ENCOUNTER FOR PREPROCEDURAL LABORATORY EXAMINATION: ICD-10-CM

## 2024-04-17 LAB — POCT - HEMOGLOBIN (HGB), QUANTITATIVE, TRANSCUTANEOUS: 11.3

## 2024-04-17 PROCEDURE — 88738 HGB QUANT TRANSCUTANEOUS: CPT

## 2024-04-17 PROCEDURE — 94010 BREATHING CAPACITY TEST: CPT

## 2024-04-17 PROCEDURE — ZZZZZ: CPT

## 2024-04-17 PROCEDURE — 94727 GAS DIL/WSHOT DETER LNG VOL: CPT

## 2024-04-17 PROCEDURE — 99214 OFFICE O/P EST MOD 30 MIN: CPT | Mod: 25

## 2024-04-17 PROCEDURE — 95012 NITRIC OXIDE EXP GAS DETER: CPT

## 2024-04-17 PROCEDURE — 94729 DIFFUSING CAPACITY: CPT

## 2024-04-17 NOTE — REVIEW OF SYSTEMS
[Hypertension] : ~T hypertension [Arthralgias] : arthralgias [Negative] : Psychiatric [Headache] : no headache [Dizziness] : no dizziness [Focal Weakness] : no focal weakness [Numbness] : no numbness [Paralysis] : no paralysis was seen [FreeTextEntry9] : IAN [FreeTextEntry6] : OA [de-identified] : Hypothyroidism, glucose intolerance

## 2024-04-17 NOTE — PROCEDURE
[FreeTextEntry1] : NIOX  24  ppb upper limits  nl 4/17/24 PFT 4/17/24 mild reduction flow rates  Mild OAD Lung volumes vnl range  pos  air  trapping  DLCO 17 % WNL HGB 11.3  NIOX  15  ppb WNl  1/15/24 Spirometry January 15, 2024 Flow rates are normal No bronchodilator Significant interval improvement compared to July 2023   Bone density DEXA scan October 27, 2023 AP spine L1-L4 osteopenia Left femoral neck osteoporosis Total left hip osteoporosis Weightbearing walking exercise  Chest x-ray PA lateral 1 year follow-up October 27, 2023 Cardiac size normal Mild scoliosis Lung fields are clear No parenchymal infiltrate pleural effusion dominant pulmonary nodule Calcification of the trachea Overall clear lungs Addendum cannot exclude some mucoid impaction bronchiectatic at the right lower lung zone  EKG 10/27/23  NSR   NIOX 11 ppb nl range 7/26/23  [place] 7/26/23  Mild OAd but no decline   POCT 6/14/23  HGBA1C 6.1  NIOx  16  ppb WNL 6/145/23  PFT no bronchodilator June 20, 2023 Very minimal reduction of flow rates with a very mild obstructive ventilatory impairment Lung volumes are normal % predicted Positive air-trapping RV/TLC ratio 136% predicted Diffusion normal 95% predicted Overall stable pulmonary physiology with a mild obstructive pattern Clinical correlation  [place] 4/14/23 mild OAD with mild  reduction flow  rates  [place] 3/15/23  Mild OAD  interval improvement at flow  rates  POCT 2/1/23 HGBA1C 6.3  EKG October 20, 2022 Normal sinus rhythm RSR nondiagnostic Combined atrial enlargement No acute changes  Chest x-ray PA lateral October 28, 2022 Normal cardiac size Lung fields are clear No parenchymal infiltrates pleural effusions or dominant pulmonary nodules Soft tissue bony structures otherwise unremarkable Impression clear lung  POCT 7/7/22 Glucose 103 HGBA1C 6.4  [place] 7/7/22 mild reduction flow rates Mild OAD NIOX  19  ppb WNL  POCT 4/18/22 Glucose 103 HGBA1C 5.7  POCT 1/10/22  Glucose  89  HGBA1C 6.1  Chest x-ray PA lateral 10/4/2021 Normal cardiac size Clear lung fields No parenchymal infiltrates pleural effusions or dominant pulmonary nodules Mild S-shaped scoliosis Impression overall clear lungs  EKG 10/4/2021 NSR  RSR  POCT 6/2/21 Glucose  HGB A1C  6.0/average glucose 126   POCT 11/30/2020 Glucose   124 HGBA1C   5.8  POCT 9/14/2020 Glucose 83  Blood draw POCT july 27 2022 Glucose 90  Data review June 22, 2022 Glucose 127 Hemoglobin A1c 6.0  HGBA!C 6.3 1/2/2022  Chest x-ray PA lateral October 17, 2019 Cardiac size normal Clear lung fields without parenchymal infiltrates pleural effusions dominant pulmonary nodules Soft tissue bony structures demonstrate some thinning of the vertebral spine Be distended hilum normal Impression clear lungs  EKG NSR RSR Oct  12  2020 NSSST  Likely  normal  for  age  Blood  draw  DATA review 9/14/20 T4 normal 6.5 TSH 0.07 Will change Synthroid dosing 88 MCG to 6 days/week Data review June 22, 2020 Lipid profile Cholesterol 207  HDL 79 Triglycerides 126 Liver function panel normal range Serum glucose 136 Serum electrolytes normal BUN 16 creatinine 0.69  Chest x-ray PA lateral October 26, 2020 Normal cardiac size Clear lung fields Lamination bilateral right and left hemidiaphragm No parenchymal infiltrate pleural effusions dominant pulmonary nodules Soft tissue bony structures demonstrate some thinning vertebral spine Very minimal scoliosis Nida mediastinum unremarkable Impression clear lungs  Blood draw

## 2024-04-17 NOTE — DISCUSSION/SUMMARY
[FreeTextEntry1] : Well visit completed July 2023 allergic  rhinitis not  active new Dx osteoporsis-  generic Boniva  monthly  with detailed instruction f/u DEX 1- 2 years GYN noted PNDS OAD  faint wheeze Not  active and stable NIOX and spirometry HTN- stable HLD Hypothyroidism Hypercholesterolemia PreDM- better dietary control addressed Trend Q 3 mos A1C Mild diastolic dysfunction Chronic osteoarthritis Mild OAD  ASX without decline at flow  rates Osteoporosis screening/ per GYN Continue Norvasc 5 mg Losartan 100  mg I change dosing to 6 days/week Synthroid 88 MCG 1 tablet p.o. daily-recheck thyroid function testing next visit-    Avoid ACE inhibitor since patient has an ACE induced cough Low sugar diet Avoid carbohydrates  Follow-up status of mammogram Completed  noted  management GYN Due colonoscopy-  Dennis Burns MD re recommended  GYN DEXA and Mammogram  management    up  to  date Vaccination profile up-to-date Completed  10 2022 COVID Bi valent vaccine Completed a new updated COVID-vaccine Believes she also received RSV vaccine It Mucinex DM for chest congestion and cough Noted interval improvement of pulmonary physiology and Follow-up 6 weeks Blood work f/u

## 2024-05-30 ENCOUNTER — APPOINTMENT (OUTPATIENT)
Dept: PULMONOLOGY | Facility: CLINIC | Age: 79
End: 2024-05-30
Payer: MEDICARE

## 2024-05-30 ENCOUNTER — NON-APPOINTMENT (OUTPATIENT)
Age: 79
End: 2024-05-30

## 2024-05-30 VITALS — SYSTOLIC BLOOD PRESSURE: 137 MMHG | OXYGEN SATURATION: 97 % | DIASTOLIC BLOOD PRESSURE: 69 MMHG | HEART RATE: 100 BPM

## 2024-05-30 DIAGNOSIS — I10 ESSENTIAL (PRIMARY) HYPERTENSION: ICD-10-CM

## 2024-05-30 DIAGNOSIS — E78.00 PURE HYPERCHOLESTEROLEMIA, UNSPECIFIED: ICD-10-CM

## 2024-05-30 DIAGNOSIS — E03.9 HYPOTHYROIDISM, UNSPECIFIED: ICD-10-CM

## 2024-05-30 DIAGNOSIS — R73.03 PREDIABETES.: ICD-10-CM

## 2024-05-30 DIAGNOSIS — Z78.9 OTHER SPECIFIED HEALTH STATUS: ICD-10-CM

## 2024-05-30 DIAGNOSIS — R94.2 ABNORMAL RESULTS OF PULMONARY FUNCTION STUDIES: ICD-10-CM

## 2024-05-30 LAB
ANION GAP SERPL CALC-SCNC: 14 MMOL/L
BUN SERPL-MCNC: 10 MG/DL
CALCIUM SERPL-MCNC: 9.2 MG/DL
CHLORIDE SERPL-SCNC: 105 MMOL/L
CHOLEST SERPL-MCNC: 166 MG/DL
CO2 SERPL-SCNC: 20 MMOL/L
CREAT SERPL-MCNC: 0.59 MG/DL
EGFR: 91 ML/MIN/1.73M2
ESTIMATED AVERAGE GLUCOSE: 123 MG/DL
GLUCOSE SERPL-MCNC: 166 MG/DL
HBA1C MFR BLD HPLC: 5.9 %
HDLC SERPL-MCNC: 91 MG/DL
LDLC SERPL CALC-MCNC: 61 MG/DL
NONHDLC SERPL-MCNC: 75 MG/DL
POTASSIUM SERPL-SCNC: 4 MMOL/L
SODIUM SERPL-SCNC: 138 MMOL/L
TRIGL SERPL-MCNC: 71 MG/DL
TSH SERPL-ACNC: 1.85 UIU/ML

## 2024-05-30 PROCEDURE — 94010 BREATHING CAPACITY TEST: CPT

## 2024-05-30 PROCEDURE — 36415 COLL VENOUS BLD VENIPUNCTURE: CPT

## 2024-05-30 PROCEDURE — 99214 OFFICE O/P EST MOD 30 MIN: CPT | Mod: 25

## 2024-05-30 NOTE — PROCEDURE
[FreeTextEntry1] : Blood draw [place] 5/30/24  minimal OAD  stable with interval improvement  NIOX  24  ppb upper limits  nl 4/17/24 PFT 4/17/24 mild reduction flow rates  Mild OAD Lung volumes vnl range  pos  air  trapping  DLCO 17 % WNL HGB 11.3  NIOX  15  ppb WNl  1/15/24 Spirometry January 15, 2024 Flow rates are normal No bronchodilator Significant interval improvement compared to July 2023   Bone density DEXA scan October 27, 2023 AP spine L1-L4 osteopenia Left femoral neck osteoporosis Total left hip osteoporosis Weightbearing walking exercise  Chest x-ray PA lateral 1 year follow-up October 27, 2023 Cardiac size normal Mild scoliosis Lung fields are clear No parenchymal infiltrate pleural effusion dominant pulmonary nodule Calcification of the trachea Overall clear lungs Addendum cannot exclude some mucoid impaction bronchiectatic at the right lower lung zone  EKG 10/27/23  NSR   NIOX 11 ppb nl range 7/26/23  [place] 7/26/23  Mild OAd but no decline   POCT 6/14/23  HGBA1C 6.1  NIOx  16  ppb WNL 6/145/23  PFT no bronchodilator June 20, 2023 Very minimal reduction of flow rates with a very mild obstructive ventilatory impairment Lung volumes are normal % predicted Positive air-trapping RV/TLC ratio 136% predicted Diffusion normal 95% predicted Overall stable pulmonary physiology with a mild obstructive pattern Clinical correlation  [place] 4/14/23 mild OAD with mild  reduction flow  rates  [place] 3/15/23  Mild OAD  interval improvement at flow  rates  POCT 2/1/23 HGBA1C 6.3  EKG October 20, 2022 Normal sinus rhythm RSR nondiagnostic Combined atrial enlargement No acute changes  Chest x-ray PA lateral October 28, 2022 Normal cardiac size Lung fields are clear No parenchymal infiltrates pleural effusions or dominant pulmonary nodules Soft tissue bony structures otherwise unremarkable Impression clear lung  POCT 7/7/22 Glucose 103 HGBA1C 6.4  [place] 7/7/22 mild reduction flow rates Mild OAD NIOX  19  ppb WNL  POCT 4/18/22 Glucose 103 HGBA1C 5.7  POCT 1/10/22  Glucose  89  HGBA1C 6.1  Chest x-ray PA lateral 10/4/2021 Normal cardiac size Clear lung fields No parenchymal infiltrates pleural effusions or dominant pulmonary nodules Mild S-shaped scoliosis Impression overall clear lungs  EKG 10/4/2021 NSR  RSR  POCT 6/2/21 Glucose  HGB A1C  6.0/average glucose 126   POCT 11/30/2020 Glucose   124 HGBA1C   5.8  POCT 9/14/2020 Glucose 83  Blood draw POCT july 27 2022 Glucose 90  Data review June 22, 2022 Glucose 127 Hemoglobin A1c 6.0  HGBA!C 6.3 1/2/2022  Chest x-ray PA lateral October 17, 2019 Cardiac size normal Clear lung fields without parenchymal infiltrates pleural effusions dominant pulmonary nodules Soft tissue bony structures demonstrate some thinning of the vertebral spine Be distended hilum normal Impression clear lungs  EKG NSR RSR Oct  12  2020 NSSST  Likely  normal  for  age  Blood  draw  DATA review 9/14/20 T4 normal 6.5 TSH 0.07 Will change Synthroid dosing 88 MCG to 6 days/week Data review June 22, 2020 Lipid profile Cholesterol 207  HDL 79 Triglycerides 126 Liver function panel normal range Serum glucose 136 Serum electrolytes normal BUN 16 creatinine 0.69  Chest x-ray PA lateral October 26, 2020 Normal cardiac size Clear lung fields Lamination bilateral right and left hemidiaphragm No parenchymal infiltrate pleural effusions dominant pulmonary nodules Soft tissue bony structures demonstrate some thinning vertebral spine Very minimal scoliosis Nida mediastinum unremarkable Impression clear lungs  Blood draw

## 2024-05-30 NOTE — REVIEW OF SYSTEMS
[Hypertension] : ~T hypertension [Arthralgias] : arthralgias [Negative] : Psychiatric [Headache] : no headache [Dizziness] : no dizziness [Focal Weakness] : no focal weakness [Numbness] : no numbness [Paralysis] : no paralysis was seen [FreeTextEntry9] : IAN [FreeTextEntry6] : OA [de-identified] : Hypothyroidism, glucose intolerance

## 2024-07-05 ENCOUNTER — RX RENEWAL (OUTPATIENT)
Age: 79
End: 2024-07-05

## 2024-07-08 ENCOUNTER — RX RENEWAL (OUTPATIENT)
Age: 79
End: 2024-07-08

## 2024-07-11 ENCOUNTER — APPOINTMENT (OUTPATIENT)
Dept: PULMONOLOGY | Facility: CLINIC | Age: 79
End: 2024-07-11
Payer: MEDICARE

## 2024-07-11 VITALS — SYSTOLIC BLOOD PRESSURE: 97 MMHG | HEART RATE: 90 BPM | OXYGEN SATURATION: 97 % | DIASTOLIC BLOOD PRESSURE: 57 MMHG

## 2024-07-11 DIAGNOSIS — R94.2 ABNORMAL RESULTS OF PULMONARY FUNCTION STUDIES: ICD-10-CM

## 2024-07-11 DIAGNOSIS — E78.00 PURE HYPERCHOLESTEROLEMIA, UNSPECIFIED: ICD-10-CM

## 2024-07-11 DIAGNOSIS — I10 ESSENTIAL (PRIMARY) HYPERTENSION: ICD-10-CM

## 2024-07-11 DIAGNOSIS — I51.9 HEART DISEASE, UNSPECIFIED: ICD-10-CM

## 2024-07-11 DIAGNOSIS — R73.03 PREDIABETES.: ICD-10-CM

## 2024-07-11 DIAGNOSIS — E03.9 HYPOTHYROIDISM, UNSPECIFIED: ICD-10-CM

## 2024-07-11 PROCEDURE — 99214 OFFICE O/P EST MOD 30 MIN: CPT | Mod: 25

## 2024-07-11 PROCEDURE — 94010 BREATHING CAPACITY TEST: CPT

## 2024-08-22 ENCOUNTER — APPOINTMENT (OUTPATIENT)
Dept: PULMONOLOGY | Facility: CLINIC | Age: 79
End: 2024-08-22
Payer: MEDICARE

## 2024-08-22 VITALS — OXYGEN SATURATION: 96 % | HEART RATE: 90 BPM | DIASTOLIC BLOOD PRESSURE: 73 MMHG | SYSTOLIC BLOOD PRESSURE: 113 MMHG

## 2024-08-22 DIAGNOSIS — R73.02 IMPAIRED GLUCOSE TOLERANCE (ORAL): ICD-10-CM

## 2024-08-22 DIAGNOSIS — E78.00 PURE HYPERCHOLESTEROLEMIA, UNSPECIFIED: ICD-10-CM

## 2024-08-22 DIAGNOSIS — I10 ESSENTIAL (PRIMARY) HYPERTENSION: ICD-10-CM

## 2024-08-22 DIAGNOSIS — R94.2 ABNORMAL RESULTS OF PULMONARY FUNCTION STUDIES: ICD-10-CM

## 2024-08-22 DIAGNOSIS — E03.9 HYPOTHYROIDISM, UNSPECIFIED: ICD-10-CM

## 2024-08-22 PROCEDURE — G2211 COMPLEX E/M VISIT ADD ON: CPT

## 2024-08-22 PROCEDURE — 36415 COLL VENOUS BLD VENIPUNCTURE: CPT

## 2024-08-22 PROCEDURE — 99213 OFFICE O/P EST LOW 20 MIN: CPT

## 2024-08-22 NOTE — PHYSICAL EXAM
[General Appearance - Well Developed] : well developed [Normal Appearance] : normal appearance [Well Groomed] : well groomed [General Appearance - Well Nourished] : well nourished [No Deformities] : no deformities [General Appearance - In No Acute Distress] : no acute distress [Normal Conjunctiva] : the conjunctiva exhibited no abnormalities [Eyelids - No Xanthelasma] : the eyelids demonstrated no xanthelasmas [Normal Oropharynx] : normal oropharynx [Neck Appearance] : the appearance of the neck was normal [Neck Cervical Mass (___cm)] : no neck mass was observed [Thyroid Diffuse Enlargement] : the thyroid was not enlarged [Jugular Venous Distention Increased] : there was no jugular-venous distention [Heart Rate And Rhythm] : heart rate and rhythm were normal [Heart Sounds] : normal S1 and S2 [Murmurs] : no murmurs present [Respiration, Rhythm And Depth] : normal respiratory rhythm and effort [Exaggerated Use Of Accessory Muscles For Inspiration] : no accessory muscle use [Chest Palpation] : palpation of the chest revealed no abnormalities [Lungs Percussion] : the lungs were normal to percussion [Abdomen Soft] : soft [Abdomen Tenderness] : non-tender [Abdomen Mass (___ Cm)] : no abdominal mass palpated [Abnormal Walk] : normal gait [Nail Clubbing] : no clubbing of the fingernails [Cyanosis, Localized] : no localized cyanosis [Petechial Hemorrhages (___cm)] : no petechial hemorrhages [] : no ischemic changes [Deep Tendon Reflexes (DTR)] : deep tendon reflexes were 2+ and symmetric [Sensation] : the sensory exam was normal to light touch and pinprick [No Focal Deficits] : no focal deficits [Oriented To Time, Place, And Person] : oriented to person, place, and time [Impaired Insight] : insight and judgment were intact [Affect] : the affect was normal [FreeTextEntry1] : healed shinges over right buttock

## 2024-08-22 NOTE — HISTORY OF PRESENT ILLNESS
[Difficulty Breathing During Exertion] : denies dyspnea on exertion [Feelings Of Weakness On Exertion] : denies exercise intolerance [Cough] : denies coughing [Wheezing] : denies wheezing [Regional Soft Tissue Swelling Both Lower Extremities] : denies lower extremity edema [Chest Pain Or Discomfort] : denies chest pain [Fever] : denies fever [Former] : is a former smoker [Never] : never [FreeTextEntry1] : CPE October 27, 2023 with review prior data imaging laboratory studies that are up to date  completed GYN completed No active complaints Does have some poor sleep hygiene but that is because of her  with Parkinson's disease  Oral dental issues with gum disease ? thrush txed with rinse completed Nystatin  dizzy lightheaded Neuro- Brain MRI February 21, 2022 Multiple white matter hypersensitivities nonspecific most consistent with chronic microvascular ischemic disease Mild global cerebral volume loss likely consistent with age No acute infarction No reported tumors hemorrhage no palpitations no chest pain describes facial distortion  Weight stable Appetite normal Denies any chronic fatigue No respiratory symptomatology No chest pain exertional chest pain No lower extremity edema No active urinary symptoms frequency DDS ongoing  management [TextBox_4] : 08/22/2024 Here for f/u  reports issue with throat, interfering with speech

## 2024-08-22 NOTE — PROCEDURE
[FreeTextEntry1] : [place] No BD 7/11/24  mild  reduction flow  rates  [place] 5/30/24  minimal OAD  stable with interval improvement  NIOX  24  ppb upper limits  nl 4/17/24 PFT 4/17/24 mild reduction flow rates  Mild OAD Lung volumes vnl range  pos  air  trapping  DLCO 17 % WNL HGB 11.3  NIOX  15  ppb WNl  1/15/24 Spirometry January 15, 2024 Flow rates are normal No bronchodilator Significant interval improvement compared to July 2023  Bone density DEXA scan October 27, 2023 AP spine L1-L4 osteopenia Left femoral neck osteoporosis Total left hip osteoporosis Weightbearing walking exercise  Chest x-ray PA lateral 1 year follow-up October 27, 2023 Cardiac size normal Mild scoliosis Lung fields are clear No parenchymal infiltrate pleural effusion dominant pulmonary nodule Calcification of the trachea Overall clear lungs Addendum cannot exclude some mucoid impaction bronchiectatic at the right lower lung zone  EKG 10/27/23  NSR   NIOX 11 ppb nl range 7/26/23  [place] 7/26/23  Mild OAd but no decline   POCT 6/14/23  HGBA1C 6.1  NIOx  16  ppb WNL 6/145/23  PFT no bronchodilator June 20, 2023 Very minimal reduction of flow rates with a very mild obstructive ventilatory impairment Lung volumes are normal % predicted Positive air-trapping RV/TLC ratio 136% predicted Diffusion normal 95% predicted Overall stable pulmonary physiology with a mild obstructive pattern Clinical correlation  [place] 4/14/23 mild OAD with mild  reduction flow  rates  [place] 3/15/23  Mild OAD  interval improvement at flow  rates  POCT 2/1/23 HGBA1C 6.3  EKG October 20, 2022 Normal sinus rhythm RSR nondiagnostic Combined atrial enlargement No acute changes  Chest x-ray PA lateral October 28, 2022 Normal cardiac size Lung fields are clear No parenchymal infiltrates pleural effusions or dominant pulmonary nodules Soft tissue bony structures otherwise unremarkable Impression clear lung  POCT 7/7/22 Glucose 103 HGBA1C 6.4  [place] 7/7/22 mild reduction flow rates Mild OAD NIOX  19  ppb WNL  POCT 4/18/22 Glucose 103 HGBA1C 5.7  POCT 1/10/22  Glucose  89  HGBA1C 6.1  Chest x-ray PA lateral 10/4/2021 Normal cardiac size Clear lung fields No parenchymal infiltrates pleural effusions or dominant pulmonary nodules Mild S-shaped scoliosis Impression overall clear lungs  EKG 10/4/2021 NSR  RSR  POCT 6/2/21 Glucose  HGB A1C  6.0/average glucose 126   POCT 11/30/2020 Glucose   124 HGBA1C   5.8  POCT 9/14/2020 Glucose 83  Blood draw POCT july 27 2022 Glucose 90  Data review June 22, 2022 Glucose 127 Hemoglobin A1c 6.0  HGBA!C 6.3 1/2/2022  Chest x-ray PA lateral October 17, 2019 Cardiac size normal Clear lung fields without parenchymal infiltrates pleural effusions dominant pulmonary nodules Soft tissue bony structures demonstrate some thinning of the vertebral spine Be distended hilum normal Impression clear lungs  EKG NSR RSR Oct  12  2020 NSSST  Likely  normal  for  age  Blood  draw  DATA review 9/14/20 T4 normal 6.5 TSH 0.07 Will change Synthroid dosing 88 MCG to 6 days/week Data review June 22, 2020 Lipid profile Cholesterol 207  HDL 79 Triglycerides 126 Liver function panel normal range Serum glucose 136 Serum electrolytes normal BUN 16 creatinine 0.69  Chest x-ray PA lateral October 26, 2020 Normal cardiac size Clear lung fields Lamination bilateral right and left hemidiaphragm No parenchymal infiltrate pleural effusions dominant pulmonary nodules Soft tissue bony structures demonstrate some thinning vertebral spine Very minimal scoliosis Nida mediastinum unremarkable Impression clear lungs  Blood draw

## 2024-08-22 NOTE — REVIEW OF SYSTEMS
[Hypertension] : ~T hypertension [Arthralgias] : arthralgias [Negative] : Psychiatric [Headache] : no headache [Dizziness] : no dizziness [Focal Weakness] : no focal weakness [Numbness] : no numbness [Paralysis] : no paralysis was seen [FreeTextEntry9] : IAN [FreeTextEntry6] : OA [de-identified] : Hypothyroidism, glucose intolerance

## 2024-08-23 ENCOUNTER — NON-APPOINTMENT (OUTPATIENT)
Age: 79
End: 2024-08-23

## 2024-08-23 LAB
ANION GAP SERPL CALC-SCNC: 11 MMOL/L
BUN SERPL-MCNC: 13 MG/DL
CALCIUM SERPL-MCNC: 8.9 MG/DL
CHLORIDE SERPL-SCNC: 102 MMOL/L
CHOLEST SERPL-MCNC: 200 MG/DL
CO2 SERPL-SCNC: 26 MMOL/L
CREAT SERPL-MCNC: 0.59 MG/DL
EGFR: 91 ML/MIN/1.73M2
ESTIMATED AVERAGE GLUCOSE: 128 MG/DL
GLUCOSE SERPL-MCNC: 154 MG/DL
HBA1C MFR BLD HPLC: 6.1 %
HDLC SERPL-MCNC: 88 MG/DL
LDLC SERPL CALC-MCNC: 97 MG/DL
NONHDLC SERPL-MCNC: 112 MG/DL
POTASSIUM SERPL-SCNC: 4.3 MMOL/L
SODIUM SERPL-SCNC: 140 MMOL/L
TRIGL SERPL-MCNC: 86 MG/DL

## 2024-10-10 ENCOUNTER — APPOINTMENT (OUTPATIENT)
Dept: PULMONOLOGY | Facility: CLINIC | Age: 79
End: 2024-10-10
Payer: MEDICARE

## 2024-10-10 VITALS — HEART RATE: 92 BPM | OXYGEN SATURATION: 96 % | SYSTOLIC BLOOD PRESSURE: 159 MMHG | DIASTOLIC BLOOD PRESSURE: 77 MMHG

## 2024-10-10 VITALS — OXYGEN SATURATION: 98 % | HEART RATE: 94 BPM

## 2024-10-10 DIAGNOSIS — E55.9 VITAMIN D DEFICIENCY, UNSPECIFIED: ICD-10-CM

## 2024-10-10 DIAGNOSIS — R73.03 PREDIABETES.: ICD-10-CM

## 2024-10-10 DIAGNOSIS — R94.2 ABNORMAL RESULTS OF PULMONARY FUNCTION STUDIES: ICD-10-CM

## 2024-10-10 PROCEDURE — 90662 IIV NO PRSV INCREASED AG IM: CPT

## 2024-10-10 PROCEDURE — G2211 COMPLEX E/M VISIT ADD ON: CPT

## 2024-10-10 PROCEDURE — 99214 OFFICE O/P EST MOD 30 MIN: CPT

## 2024-10-10 PROCEDURE — G0008: CPT

## 2024-11-13 DIAGNOSIS — Z00.00 ENCOUNTER FOR GENERAL ADULT MEDICAL EXAMINATION W/OUT ABNORMAL FINDINGS: ICD-10-CM

## 2024-11-14 ENCOUNTER — APPOINTMENT (OUTPATIENT)
Dept: PULMONOLOGY | Facility: CLINIC | Age: 79
End: 2024-11-14

## 2024-12-30 ENCOUNTER — LABORATORY RESULT (OUTPATIENT)
Age: 79
End: 2024-12-30

## 2024-12-30 ENCOUNTER — NON-APPOINTMENT (OUTPATIENT)
Age: 79
End: 2024-12-30

## 2024-12-30 ENCOUNTER — APPOINTMENT (OUTPATIENT)
Dept: PULMONOLOGY | Facility: CLINIC | Age: 79
End: 2024-12-30
Payer: MEDICARE

## 2024-12-30 VITALS — HEART RATE: 104 BPM | DIASTOLIC BLOOD PRESSURE: 81 MMHG | OXYGEN SATURATION: 96 % | SYSTOLIC BLOOD PRESSURE: 152 MMHG

## 2024-12-30 DIAGNOSIS — Z00.00 ENCOUNTER FOR GENERAL ADULT MEDICAL EXAMINATION W/OUT ABNORMAL FINDINGS: ICD-10-CM

## 2024-12-30 DIAGNOSIS — E03.9 HYPOTHYROIDISM, UNSPECIFIED: ICD-10-CM

## 2024-12-30 DIAGNOSIS — R73.03 PREDIABETES.: ICD-10-CM

## 2024-12-30 DIAGNOSIS — R94.2 ABNORMAL RESULTS OF PULMONARY FUNCTION STUDIES: ICD-10-CM

## 2024-12-30 DIAGNOSIS — I51.9 HEART DISEASE, UNSPECIFIED: ICD-10-CM

## 2024-12-30 LAB
ALBUMIN: 80
BILIRUB UR QL STRIP: NORMAL
CLARITY UR: CLEAR
COLLECTION METHOD: NORMAL
CREATININE: 300
GLUCOSE UR-MCNC: NORMAL
HCG UR QL: 0.2 EU/DL
HGB UR QL STRIP.AUTO: NORMAL
KETONES UR-MCNC: NORMAL
LEUKOCYTE ESTERASE UR QL STRIP: NORMAL
MICROALBUMIN/CREAT UR TEST STR-RTO: NORMAL
NITRITE UR QL STRIP: NORMAL
PH UR STRIP: 5.5
POCT - HEMOGLOBIN (HGB), QUANTITATIVE, TRANSCUTANEOUS: 12.3
PROT UR STRIP-MCNC: NORMAL
SP GR UR STRIP: 1.02

## 2024-12-30 PROCEDURE — 95012 NITRIC OXIDE EXP GAS DETER: CPT

## 2024-12-30 PROCEDURE — 99397 PER PM REEVAL EST PAT 65+ YR: CPT | Mod: 25

## 2024-12-30 PROCEDURE — 94010 BREATHING CAPACITY TEST: CPT

## 2024-12-30 PROCEDURE — 82044 UR ALBUMIN SEMIQUANTITATIVE: CPT | Mod: QW

## 2024-12-30 PROCEDURE — 93000 ELECTROCARDIOGRAM COMPLETE: CPT

## 2024-12-30 PROCEDURE — ZZZZZ: CPT

## 2024-12-30 PROCEDURE — 36415 COLL VENOUS BLD VENIPUNCTURE: CPT

## 2024-12-30 PROCEDURE — 71046 X-RAY EXAM CHEST 2 VIEWS: CPT

## 2024-12-30 PROCEDURE — 81003 URINALYSIS AUTO W/O SCOPE: CPT | Mod: QW

## 2024-12-30 PROCEDURE — 88738 HGB QUANT TRANSCUTANEOUS: CPT

## 2024-12-30 PROCEDURE — 94729 DIFFUSING CAPACITY: CPT

## 2024-12-30 PROCEDURE — 94727 GAS DIL/WSHOT DETER LNG VOL: CPT

## 2024-12-31 ENCOUNTER — NON-APPOINTMENT (OUTPATIENT)
Age: 79
End: 2024-12-31

## 2024-12-31 LAB
25(OH)D3 SERPL-MCNC: 25.2 NG/ML
ALBUMIN SERPL ELPH-MCNC: 4.3 G/DL
ALP BLD-CCNC: 54 U/L
ALT SERPL-CCNC: 13 U/L
AST SERPL-CCNC: 22 U/L
BASOPHILS # BLD AUTO: 0.04 K/UL
BASOPHILS NFR BLD AUTO: 0.6 %
BILIRUB DIRECT SERPL-MCNC: 0.1 MG/DL
BILIRUB INDIRECT SERPL-MCNC: 0.2 MG/DL
BILIRUB SERPL-MCNC: 0.3 MG/DL
CHOLEST SERPL-MCNC: 243 MG/DL
EOSINOPHIL # BLD AUTO: 0.03 K/UL
EOSINOPHIL NFR BLD AUTO: 0.4 %
ESTIMATED AVERAGE GLUCOSE: 137 MG/DL
HBA1C MFR BLD HPLC: 6.4 %
HCT VFR BLD CALC: 43.4 %
HCV AB SER QL: NONREACTIVE
HCV S/CO RATIO: 0.09 S/CO
HDLC SERPL-MCNC: 98 MG/DL
HGB BLD-MCNC: 13.3 G/DL
IMM GRANULOCYTES NFR BLD AUTO: 0.4 %
LDLC SERPL CALC-MCNC: 135 MG/DL
LYMPHOCYTES # BLD AUTO: 1.2 K/UL
LYMPHOCYTES NFR BLD AUTO: 16.8 %
MAN DIFF?: NORMAL
MCHC RBC-ENTMCNC: 30.1 PG
MCHC RBC-ENTMCNC: 30.6 G/DL
MCV RBC AUTO: 98.2 FL
MONOCYTES # BLD AUTO: 0.51 K/UL
MONOCYTES NFR BLD AUTO: 7.1 %
NEUTROPHILS # BLD AUTO: 5.34 K/UL
NEUTROPHILS NFR BLD AUTO: 74.7 %
NONHDLC SERPL-MCNC: 145 MG/DL
PLATELET # BLD AUTO: 335 K/UL
PROT SERPL-MCNC: 7.1 G/DL
RBC # BLD: 4.42 M/UL
RBC # FLD: 13.3 %
T3 SERPL-MCNC: 93 NG/DL
T3RU NFR SERPL: 1.2 TBI
T4 FREE SERPL-MCNC: 0.8 NG/DL
T4 SERPL-MCNC: 5.1 UG/DL
TRIGL SERPL-MCNC: 64 MG/DL
TSH SERPL-ACNC: 7.99 UIU/ML
WBC # FLD AUTO: 7.15 K/UL

## 2025-01-01 LAB
ANION GAP SERPL CALC-SCNC: 15 MMOL/L
BUN SERPL-MCNC: 17 MG/DL
CALCIUM SERPL-MCNC: 9.6 MG/DL
CHLORIDE SERPL-SCNC: 105 MMOL/L
CO2 SERPL-SCNC: 22 MMOL/L
CREAT SERPL-MCNC: 0.58 MG/DL
EGFR: 91 ML/MIN/1.73M2
GLUCOSE SERPL-MCNC: 113 MG/DL
POTASSIUM SERPL-SCNC: 4.5 MMOL/L
SODIUM SERPL-SCNC: 142 MMOL/L

## 2025-01-31 ENCOUNTER — APPOINTMENT (OUTPATIENT)
Dept: PULMONOLOGY | Facility: CLINIC | Age: 80
End: 2025-01-31
Payer: MEDICARE

## 2025-01-31 VITALS
TEMPERATURE: 98.2 F | OXYGEN SATURATION: 97 % | SYSTOLIC BLOOD PRESSURE: 130 MMHG | HEART RATE: 97 BPM | DIASTOLIC BLOOD PRESSURE: 64 MMHG

## 2025-01-31 DIAGNOSIS — R41.89 OTHER SYMPTOMS AND SIGNS INVOLVING COGNITIVE FUNCTIONS AND AWARENESS: ICD-10-CM

## 2025-01-31 DIAGNOSIS — R73.03 PREDIABETES.: ICD-10-CM

## 2025-01-31 DIAGNOSIS — E78.00 PURE HYPERCHOLESTEROLEMIA, UNSPECIFIED: ICD-10-CM

## 2025-01-31 DIAGNOSIS — R73.02 IMPAIRED GLUCOSE TOLERANCE (ORAL): ICD-10-CM

## 2025-01-31 DIAGNOSIS — E03.9 HYPOTHYROIDISM, UNSPECIFIED: ICD-10-CM

## 2025-01-31 PROCEDURE — G2211 COMPLEX E/M VISIT ADD ON: CPT

## 2025-01-31 PROCEDURE — 99214 OFFICE O/P EST MOD 30 MIN: CPT

## 2025-02-16 ENCOUNTER — EMERGENCY (EMERGENCY)
Facility: HOSPITAL | Age: 80
LOS: 1 days | Discharge: HOME | End: 2025-02-16
Attending: EMERGENCY MEDICINE | Admitting: EMERGENCY MEDICINE
Payer: MEDICARE

## 2025-02-16 VITALS
OXYGEN SATURATION: 96 % | RESPIRATION RATE: 16 BRPM | DIASTOLIC BLOOD PRESSURE: 53 MMHG | SYSTOLIC BLOOD PRESSURE: 191 MMHG | HEART RATE: 85 BPM | TEMPERATURE: 98 F | WEIGHT: 85.1 LBS

## 2025-02-16 VITALS
RESPIRATION RATE: 18 BRPM | OXYGEN SATURATION: 97 % | SYSTOLIC BLOOD PRESSURE: 169 MMHG | DIASTOLIC BLOOD PRESSURE: 62 MMHG | TEMPERATURE: 98 F | HEART RATE: 84 BPM

## 2025-02-16 LAB
ALBUMIN SERPL ELPH-MCNC: 4.4 G/DL — SIGNIFICANT CHANGE UP (ref 3.3–5)
ALP SERPL-CCNC: 50 U/L — SIGNIFICANT CHANGE UP (ref 40–120)
ALT FLD-CCNC: 12 U/L — SIGNIFICANT CHANGE UP (ref 4–33)
ANION GAP SERPL CALC-SCNC: 11 MMOL/L — SIGNIFICANT CHANGE UP (ref 7–14)
APPEARANCE UR: CLEAR — SIGNIFICANT CHANGE UP
AST SERPL-CCNC: 23 U/L — SIGNIFICANT CHANGE UP (ref 4–32)
BASOPHILS # BLD AUTO: 0.03 K/UL — SIGNIFICANT CHANGE UP (ref 0–0.2)
BASOPHILS NFR BLD AUTO: 0.4 % — SIGNIFICANT CHANGE UP (ref 0–2)
BILIRUB SERPL-MCNC: <0.2 MG/DL — SIGNIFICANT CHANGE UP (ref 0.2–1.2)
BILIRUB UR-MCNC: NEGATIVE — SIGNIFICANT CHANGE UP
BUN SERPL-MCNC: 10 MG/DL — SIGNIFICANT CHANGE UP (ref 7–23)
CALCIUM SERPL-MCNC: 9.3 MG/DL — SIGNIFICANT CHANGE UP (ref 8.4–10.5)
CHLORIDE SERPL-SCNC: 103 MMOL/L — SIGNIFICANT CHANGE UP (ref 98–107)
CO2 SERPL-SCNC: 26 MMOL/L — SIGNIFICANT CHANGE UP (ref 22–31)
COLOR SPEC: YELLOW — SIGNIFICANT CHANGE UP
CREAT SERPL-MCNC: 0.52 MG/DL — SIGNIFICANT CHANGE UP (ref 0.5–1.3)
DIFF PNL FLD: NEGATIVE — SIGNIFICANT CHANGE UP
EGFR: 94 ML/MIN/1.73M2 — SIGNIFICANT CHANGE UP
EOSINOPHIL # BLD AUTO: 0.03 K/UL — SIGNIFICANT CHANGE UP (ref 0–0.5)
EOSINOPHIL NFR BLD AUTO: 0.4 % — SIGNIFICANT CHANGE UP (ref 0–6)
FLUAV AG NPH QL: SIGNIFICANT CHANGE UP
FLUBV AG NPH QL: SIGNIFICANT CHANGE UP
GLUCOSE SERPL-MCNC: 104 MG/DL — HIGH (ref 70–99)
GLUCOSE UR QL: NEGATIVE MG/DL — SIGNIFICANT CHANGE UP
HCT VFR BLD CALC: 38.9 % — SIGNIFICANT CHANGE UP (ref 34.5–45)
HGB BLD-MCNC: 12.5 G/DL — SIGNIFICANT CHANGE UP (ref 11.5–15.5)
IANC: 5.43 K/UL — SIGNIFICANT CHANGE UP (ref 1.8–7.4)
IMM GRANULOCYTES NFR BLD AUTO: 0.3 % — SIGNIFICANT CHANGE UP (ref 0–0.9)
KETONES UR-MCNC: NEGATIVE MG/DL — SIGNIFICANT CHANGE UP
LEUKOCYTE ESTERASE UR-ACNC: ABNORMAL
LIDOCAIN IGE QN: 64 U/L — HIGH (ref 7–60)
LYMPHOCYTES # BLD AUTO: 1.46 K/UL — SIGNIFICANT CHANGE UP (ref 1–3.3)
LYMPHOCYTES # BLD AUTO: 19.1 % — SIGNIFICANT CHANGE UP (ref 13–44)
MAGNESIUM SERPL-MCNC: 2.3 MG/DL — SIGNIFICANT CHANGE UP (ref 1.6–2.6)
MCHC RBC-ENTMCNC: 29.9 PG — SIGNIFICANT CHANGE UP (ref 27–34)
MCHC RBC-ENTMCNC: 32.1 G/DL — SIGNIFICANT CHANGE UP (ref 32–36)
MCV RBC AUTO: 93.1 FL — SIGNIFICANT CHANGE UP (ref 80–100)
MONOCYTES # BLD AUTO: 0.69 K/UL — SIGNIFICANT CHANGE UP (ref 0–0.9)
MONOCYTES NFR BLD AUTO: 9 % — SIGNIFICANT CHANGE UP (ref 2–14)
NEUTROPHILS # BLD AUTO: 5.43 K/UL — SIGNIFICANT CHANGE UP (ref 1.8–7.4)
NEUTROPHILS NFR BLD AUTO: 70.8 % — SIGNIFICANT CHANGE UP (ref 43–77)
NITRITE UR-MCNC: NEGATIVE — SIGNIFICANT CHANGE UP
NRBC # BLD AUTO: 0 K/UL — SIGNIFICANT CHANGE UP (ref 0–0)
NRBC # FLD: 0 K/UL — SIGNIFICANT CHANGE UP (ref 0–0)
NRBC BLD AUTO-RTO: 0 /100 WBCS — SIGNIFICANT CHANGE UP (ref 0–0)
PH UR: 7 — SIGNIFICANT CHANGE UP (ref 5–8)
PHOSPHATE SERPL-MCNC: 4 MG/DL — SIGNIFICANT CHANGE UP (ref 2.5–4.5)
PLATELET # BLD AUTO: 317 K/UL — SIGNIFICANT CHANGE UP (ref 150–400)
POTASSIUM SERPL-MCNC: 4.2 MMOL/L — SIGNIFICANT CHANGE UP (ref 3.5–5.3)
POTASSIUM SERPL-SCNC: 4.2 MMOL/L — SIGNIFICANT CHANGE UP (ref 3.5–5.3)
PROT SERPL-MCNC: 7.1 G/DL — SIGNIFICANT CHANGE UP (ref 6–8.3)
PROT UR-MCNC: NEGATIVE MG/DL — SIGNIFICANT CHANGE UP
RBC # BLD: 4.18 M/UL — SIGNIFICANT CHANGE UP (ref 3.8–5.2)
RBC # FLD: 13 % — SIGNIFICANT CHANGE UP (ref 10.3–14.5)
RSV RNA NPH QL NAA+NON-PROBE: SIGNIFICANT CHANGE UP
SARS-COV-2 RNA SPEC QL NAA+PROBE: SIGNIFICANT CHANGE UP
SODIUM SERPL-SCNC: 140 MMOL/L — SIGNIFICANT CHANGE UP (ref 135–145)
SP GR SPEC: 1.01 — SIGNIFICANT CHANGE UP (ref 1–1.03)
UROBILINOGEN FLD QL: 0.2 MG/DL — SIGNIFICANT CHANGE UP (ref 0.2–1)
WBC # BLD: 7.66 K/UL — SIGNIFICANT CHANGE UP (ref 3.8–10.5)
WBC # FLD AUTO: 7.66 K/UL — SIGNIFICANT CHANGE UP (ref 3.8–10.5)

## 2025-02-16 PROCEDURE — 70498 CT ANGIOGRAPHY NECK: CPT | Mod: 26

## 2025-02-16 PROCEDURE — 74177 CT ABD & PELVIS W/CONTRAST: CPT | Mod: 26

## 2025-02-16 PROCEDURE — 70496 CT ANGIOGRAPHY HEAD: CPT | Mod: 26

## 2025-02-16 PROCEDURE — 99285 EMERGENCY DEPT VISIT HI MDM: CPT

## 2025-02-16 PROCEDURE — 71045 X-RAY EXAM CHEST 1 VIEW: CPT | Mod: 26

## 2025-02-16 RX ORDER — FAMOTIDINE 10 MG/ML
20 INJECTION INTRAVENOUS ONCE
Refills: 0 | Status: COMPLETED | OUTPATIENT
Start: 2025-02-16 | End: 2025-02-16

## 2025-02-16 RX ADMIN — FAMOTIDINE 20 MILLIGRAM(S): 10 INJECTION INTRAVENOUS at 22:34

## 2025-02-16 NOTE — ED PROVIDER NOTE - CLINICAL SUMMARY MEDICAL DECISION MAKING FREE TEXT BOX
This is a 79-year-old female history of HTN, hypothyroidism, HLD, presenting for evaluation for slurred speech.  The patient is with her son who is at bedside and states that the symptoms have been going on for approximately 1 week.  The patient lives at home alone with her  and her son states that she gave them a call today because she said that she had not been eating.  The patient son also states that he has noticed that she has had slurred speech.  The patient was taken for evaluation by her primary care provider and was referred to get a cognitive screening.  The patient denies any chest pain, shortness of breath, nausea, vomiting, fevers or chills.  The patient is A&O x 3 on examination. The son however reports that he's noticed that his mother has been more forgetful. The patient is also endorsing discomfort in her abdomen, mostly localized to the epigastric region. The patient denies diarrhea but she reports that she is having constipation. The patient reports that she has had no recent falls.     VS. Patient hypertensive, will continue to reassess. PE. Mild TTP in the epigastric region otherwise no focal, motor or sensory deficits. The differential includes but is not limited to CVA, dementia, intra-abdominal pathology, ischemic vs infectious, UTI, PNA. Will obtain basic labs, UA, CT head, CTA head and neck, chest xray and EKG.     Final Dispo pending labs, imaging and reassessment. This is an initial assessment, Final management is subject to change. This is a 79-year-old female history of HTN, hypothyroidism, HLD, presenting for evaluation for slurred speech.  The patient is with her son who is at bedside and states that the symptoms have been going on for approximately 1 week.  The patient lives at home alone with her  and her son states that she gave them a call today because she said that she had not been eating.  The patient son also states that he has noticed that she has had slurred speech.  The patient was taken for evaluation by her primary care provider and was referred to get a cognitive screening.  The patient denies any chest pain, shortness of breath, nausea, vomiting, fevers or chills.  The patient is A&O x 3 on examination. The son however reports that he's noticed that his mother has been more forgetful. The patient is also endorsing discomfort in her abdomen, mostly localized to the epigastric region. The patient denies diarrhea but she reports that she is having constipation. The patient reports that she has had no recent falls.     VS. Patient hypertensive, will continue to reassess. PE. Mild TTP in the epigastric region otherwise no focal, motor or sensory deficits. The differential includes but is not limited to CVA, dementia, intra-abdominal pathology, ischemic vs infectious, UTI, PNA. Will obtain basic labs, UA, CT head, CTA head and neck, chest xray and EKG.     Final Dispo pending labs, imaging and reassessment. This is an initial assessment, Final management is subject to change.    Rain Fink MD attending physician 79-year-old woman comes in with a question of slurred speech and memory issues for the last week.  Previously she was in her apartment with her  who has ongoing Parkinson's and is now hospitalized probably awaiting placement.  In the meantime the patient has been noted to have increased memory issues or perhaps his on earth memory issues that were there.  Care is being provided at home by home care through [facility] with physician visits.  Last visit patient was supposed to be referred for cognitive testing and is going to be following up with neurologist.  Patient is very anxious in the house and focused on her cat as per her son.  She has her son with her in the emergency department there is a daughter that is also involved with her care.  There are no current aids in the house with the patient    Blood pressure 191/53 respiratory rate 16 heart rate 85 afebrile O2 sat 96    Pt alert and can phonate well  h at/nc  perrl, conj clear, sclera anicteric,  neck supple  abd soft no r/g/t  ext no edema no deformities  neueo awake, normal gait moves all extremities with strength  Simple memory questions patient says that her 's been out of the house for the last year and a half but in fact he has been gone for 2 weeks as per the son she has difficulty with any significant memory task  psych anxious  vs reasonable    Patient history and physical is consistent with dementia issues.  Patient is describing word finding problems and that she tried to  her phone and have a conversation but could not think of the words she wanted to say.  She has not had a CAT scan previously and it is very reasonable for us to scan her to look for additional issues as well as infection to see if that is worsening the situation.  I did an extensive conversation with the son at the bedside who fully appreciates what is going on with his mom and said that he will make sure she is in a safe environment tonight.  They are looking into long-term care options whether in home with aides or in a facility

## 2025-02-16 NOTE — ED ADULT NURSE NOTE - OBJECTIVE STATEMENT
Pt received to spot 3a   , awake and alert, A&OX3, ambulatory. C/o forgetting her thoughts mid sentence for about two weeks. Son states she was seen by PMD 1 week ago and was told it was anxiety related. Son states he noticed patient became tremulous 1 week ago. Pt having hard time getting words out to explain her situation. Son stating she has also been forgetting to eat, needs to be reminded. No neuro deficit noted, pt states sometimes hurts when she pees.  Pt in NAD, skin is clean dry and intact, abdomen is soft, nontender, and nondistended. Respirations even and unlabored. Denies CP, SOB, N/V, HA, dizziness, palpitations, blurry vision. 20G IV placed to L AC, labs drawn and sent.    Bed in lowest position, call bell within reach. Safety maintained. Plan of care ongoing. Hemigard Intro: Due to skin fragility and wound tension, it was decided to use HEMIGARD adhesive retention suture devices to permit a linear closure. The skin was cleaned and dried for a 6cm distance away from the wound. Excessive hair, if present, was removed to allow for adhesion.

## 2025-02-16 NOTE — ED PROVIDER NOTE - PATIENT PORTAL LINK FT
You can access the FollowMyHealth Patient Portal offered by [facility] by registering at the following website: [email or web address]. By joining [facility]’s FollowMyHealth portal, you will also be able to view your health information using other applications (apps) compatible with our system.

## 2025-02-16 NOTE — ED PROVIDER NOTE - PROGRESS NOTE DETAILS
Monique Shannon, PGY-3 DO:  Labs non actionable. Ct imagine showing no CVA, or acute ischemia. CT abd and pelvis showing Cyst Vs. IPMN. Patient TBDC. Given strict return precautions. Patient is agreeable with plan.

## 2025-02-16 NOTE — ED ADULT NURSE NOTE - NSFALLUNIVINTERV_ED_ALL_ED
Bed/Stretcher in lowest position, wheels locked, appropriate side rails in place/Call bell, personal items and telephone in reach/Instruct patient to call for assistance before getting out of bed/chair/stretcher/Non-slip footwear applied when patient is off stretcher/[place] to call system/Physically safe environment - no spills, clutter or unnecessary equipment/Purposeful proactive rounding/Room/bathroom lighting operational, light cord in reach

## 2025-02-16 NOTE — ED ADULT TRIAGE NOTE - CHIEF COMPLAINT QUOTE
Pt states she been having slurred speech and word finding issues x 2 weeks. Ambulatory with steady gait, no neuro deficits noted. Was seen by PMD 1 week ago and was told it was anxiety related. Son states he noticed patient became tremulous 1 week ago. History of HTN, HLD, hypothyroid. Systolics in the 190s but states shes not on blood pressure meds. Denies chest pain, SOB, headache.

## 2025-02-16 NOTE — ED ADULT NURSE REASSESSMENT NOTE - NS ED NURSE REASSESS COMMENT FT1
Pt awake and alert, , resp even and unlabored. Pt in NAD. IV patent. Denies CP, SOB, HA, dizziness, palpitations, blurry vision. Resting comfortably. Safety being maintained, plan of care ongoing.

## 2025-02-16 NOTE — ED PROVIDER NOTE - NSFOLLOWUPINSTRUCTIONS_ED_ALL_ED_FT
Thank you for visiting the ED today!  You were seen for confusion and slurred speech as well as abdominal pain.     You had imaging done of your head that did not show any new strokes, You had imaging done of you abdomen and pelvis that did however show pancreatic cyst. You will need to follow with you primary doctor or gastroenterologist for this.     Please follow up with your primary care physician within the next 1-2 weeks.   Return to the ER for any new or concerning symptoms.     You may take 650 mg of acetaminophen and/ or 600 mg of Motrin (with food) every six hours as needed for pain.   Drink plenty of fluids and rest.

## 2025-02-17 LAB
CHOLEST SERPL-MCNC: 192 MG/DL — SIGNIFICANT CHANGE UP
HDLC SERPL-MCNC: 92 MG/DL — SIGNIFICANT CHANGE UP
LIPID PNL WITH DIRECT LDL SERPL: 87 MG/DL — SIGNIFICANT CHANGE UP
NON HDL CHOLESTEROL: 100 MG/DL — SIGNIFICANT CHANGE UP
TRIGL SERPL-MCNC: 68 MG/DL — SIGNIFICANT CHANGE UP

## 2025-02-18 ENCOUNTER — NON-APPOINTMENT (OUTPATIENT)
Age: 80
End: 2025-02-18

## 2025-03-14 ENCOUNTER — APPOINTMENT (OUTPATIENT)
Dept: PULMONOLOGY | Facility: CLINIC | Age: 80
End: 2025-03-14

## 2025-06-11 NOTE — DISCUSSION/SUMMARY
If provider orders tests at today's visit, patient would like to be contacted via Either Telephone OR [employer].  If to contact patient by phone, patient's preferred phone # is [phone] (Cell) and it is OK to leave message on voice mail or with family member.  If medications are ordered at today's visit, the pharmacy name/location patient would like them to be sent to is   [id number] [address] [facility]  [address] [facility] [address]  [facility] [address]  Phone: [phone] Fax: [phone]     [FreeTextEntry1] : Completed well visit Oct  2020\par HLD\par Hypothyroidism\par Hypercholesterolemia\par Glucose intolerance-recheck hemoglobin A1c and UA 2020\par Mild diastolic dysfunction\par Chronic osteoarthritis\par Osteoporosis screening July 2021\par \par Medications reviewed-\par Had discontinued losartan due to recall in this patient\par Avoid ACE inhibitor since patient has an ACE induced cough\par Low-dose Norvasc 2.5 mg with blood pressure recheck\par Low sugar diet\par Avoid carbohydrates\par \par \par \par Follow-up status of mammogram Completed BiRads 1- Feb 2020 due\par Due colonoscopy-  Thalia Burns MD\par

## 2025-07-21 ENCOUNTER — RX RENEWAL (OUTPATIENT)
Age: 80
End: 2025-07-21

## 2025-07-22 NOTE — ED PROVIDER NOTE - ENMT NEGATIVE STATEMENT, MLM
Resident at bedside assessing the patient   Ears: no ear pain and no hearing problems.Nose: Difficulty breathing through nose

## 2025-07-24 ENCOUNTER — RX RENEWAL (OUTPATIENT)
Age: 80
End: 2025-07-24

## 2025-08-24 ENCOUNTER — NON-APPOINTMENT (OUTPATIENT)
Age: 80
End: 2025-08-24

## 2025-09-04 ENCOUNTER — APPOINTMENT (OUTPATIENT)
Dept: PULMONOLOGY | Facility: CLINIC | Age: 80
End: 2025-09-04
Payer: MEDICARE

## 2025-09-04 ENCOUNTER — LABORATORY RESULT (OUTPATIENT)
Age: 80
End: 2025-09-04

## 2025-09-04 VITALS
BODY MASS INDEX: 16.33 KG/M2 | RESPIRATION RATE: 16 BRPM | OXYGEN SATURATION: 97 % | SYSTOLIC BLOOD PRESSURE: 118 MMHG | HEART RATE: 78 BPM | WEIGHT: 98 LBS | HEIGHT: 65 IN | DIASTOLIC BLOOD PRESSURE: 68 MMHG

## 2025-09-04 DIAGNOSIS — R41.89 OTHER SYMPTOMS AND SIGNS INVOLVING COGNITIVE FUNCTIONS AND AWARENESS: ICD-10-CM

## 2025-09-04 DIAGNOSIS — I10 ESSENTIAL (PRIMARY) HYPERTENSION: ICD-10-CM

## 2025-09-04 DIAGNOSIS — R94.2 ABNORMAL RESULTS OF PULMONARY FUNCTION STUDIES: ICD-10-CM

## 2025-09-04 DIAGNOSIS — K21.9 GASTRO-ESOPHAGEAL REFLUX DISEASE W/OUT ESOPHAGITIS: ICD-10-CM

## 2025-09-04 DIAGNOSIS — E03.9 HYPOTHYROIDISM, UNSPECIFIED: ICD-10-CM

## 2025-09-04 DIAGNOSIS — E78.00 PURE HYPERCHOLESTEROLEMIA, UNSPECIFIED: ICD-10-CM

## 2025-09-04 DIAGNOSIS — R73.03 PREDIABETES.: ICD-10-CM

## 2025-09-04 PROCEDURE — 99215 OFFICE O/P EST HI 40 MIN: CPT | Mod: 25

## 2025-09-04 PROCEDURE — 94010 BREATHING CAPACITY TEST: CPT

## 2025-09-04 PROCEDURE — 71046 X-RAY EXAM CHEST 2 VIEWS: CPT

## 2025-09-04 PROCEDURE — 36415 COLL VENOUS BLD VENIPUNCTURE: CPT

## 2025-09-05 ENCOUNTER — NON-APPOINTMENT (OUTPATIENT)
Age: 80
End: 2025-09-05

## 2025-09-05 ENCOUNTER — RX RENEWAL (OUTPATIENT)
Age: 80
End: 2025-09-05

## 2025-09-05 LAB
25(OH)D3 SERPL-MCNC: 24.2 NG/ML
ALBUMIN SERPL ELPH-MCNC: 4.3 G/DL
ALP BLD-CCNC: 54 U/L
ALT SERPL-CCNC: 24 U/L
ANION GAP SERPL CALC-SCNC: 14 MMOL/L
AST SERPL-CCNC: 30 U/L
BASOPHILS # BLD AUTO: 0.05 K/UL
BASOPHILS NFR BLD AUTO: 0.7 %
BILIRUB DIRECT SERPL-MCNC: 0.08 MG/DL
BILIRUB INDIRECT SERPL-MCNC: 0.1 MG/DL
BILIRUB SERPL-MCNC: 0.2 MG/DL
BUN SERPL-MCNC: 15 MG/DL
CALCIUM SERPL-MCNC: 9.1 MG/DL
CHLORIDE SERPL-SCNC: 105 MMOL/L
CHOLEST SERPL-MCNC: 175 MG/DL
CO2 SERPL-SCNC: 23 MMOL/L
CREAT SERPL-MCNC: 0.66 MG/DL
EGFRCR SERPLBLD CKD-EPI 2021: 88 ML/MIN/1.73M2
EOSINOPHIL # BLD AUTO: 0.12 K/UL
EOSINOPHIL NFR BLD AUTO: 1.8 %
ESTIMATED AVERAGE GLUCOSE: 128 MG/DL
GLUCOSE SERPL-MCNC: 104 MG/DL
HBA1C MFR BLD HPLC: 6.1 %
HCT VFR BLD CALC: 39 %
HDLC SERPL-MCNC: 82 MG/DL
HGB BLD-MCNC: 12.4 G/DL
IMM GRANULOCYTES NFR BLD AUTO: 0.1 %
LDLC SERPL-MCNC: 75 MG/DL
LYMPHOCYTES # BLD AUTO: 1.65 K/UL
LYMPHOCYTES NFR BLD AUTO: 24.6 %
MAN DIFF?: NORMAL
MCHC RBC-ENTMCNC: 30.1 PG
MCHC RBC-ENTMCNC: 31.8 G/DL
MCV RBC AUTO: 94.7 FL
MONOCYTES # BLD AUTO: 0.7 K/UL
MONOCYTES NFR BLD AUTO: 10.4 %
NEUTROPHILS # BLD AUTO: 4.17 K/UL
NEUTROPHILS NFR BLD AUTO: 62.4 %
NONHDLC SERPL-MCNC: 93 MG/DL
PLATELET # BLD AUTO: 277 K/UL
POTASSIUM SERPL-SCNC: 4.6 MMOL/L
PROT SERPL-MCNC: 6.8 G/DL
RBC # BLD: 4.12 M/UL
RBC # FLD: 13 %
SODIUM SERPL-SCNC: 142 MMOL/L
T3 SERPL-MCNC: 82 NG/DL
T3RU NFR SERPL: 1 TBI
T4 FREE SERPL-MCNC: 1.4 NG/DL
T4 SERPL-MCNC: 6.7 UG/DL
TRIGL SERPL-MCNC: 103 MG/DL
TSH SERPL-ACNC: 0.38 UIU/ML
WBC # FLD AUTO: 6.7 K/UL

## 2025-09-05 RX ORDER — FAMOTIDINE 20 MG/1
20 TABLET, FILM COATED ORAL
Qty: 90 | Refills: 0 | Status: ACTIVE | COMMUNITY
Start: 2025-09-04 | End: 1900-01-01

## 2025-09-08 ENCOUNTER — EMERGENCY (EMERGENCY)
Facility: HOSPITAL | Age: 80
LOS: 1 days | End: 2025-09-08
Attending: EMERGENCY MEDICINE | Admitting: EMERGENCY MEDICINE
Payer: MEDICARE

## 2025-09-08 VITALS
DIASTOLIC BLOOD PRESSURE: 73 MMHG | HEIGHT: 65 IN | OXYGEN SATURATION: 98 % | SYSTOLIC BLOOD PRESSURE: 159 MMHG | HEART RATE: 85 BPM | TEMPERATURE: 98 F | WEIGHT: 95.02 LBS | RESPIRATION RATE: 18 BRPM

## 2025-09-08 VITALS
HEART RATE: 87 BPM | DIASTOLIC BLOOD PRESSURE: 73 MMHG | RESPIRATION RATE: 16 BRPM | SYSTOLIC BLOOD PRESSURE: 170 MMHG | TEMPERATURE: 98 F | OXYGEN SATURATION: 97 %

## 2025-09-08 PROCEDURE — 99283 EMERGENCY DEPT VISIT LOW MDM: CPT

## 2025-09-12 ENCOUNTER — RX RENEWAL (OUTPATIENT)
Age: 80
End: 2025-09-12

## 2025-09-16 ENCOUNTER — APPOINTMENT (OUTPATIENT)
Dept: OTOLARYNGOLOGY | Facility: CLINIC | Age: 80
End: 2025-09-16
Payer: MEDICARE

## 2025-09-16 DIAGNOSIS — B37.89 OTHER SITES OF CANDIDIASIS: ICD-10-CM

## 2025-09-16 DIAGNOSIS — R05.3 CHRONIC COUGH: ICD-10-CM

## 2025-09-16 DIAGNOSIS — R09.89 OTHER SPECIFIED SYMPTOMS AND SIGNS INVOLVING THE CIRCULATORY AND RESPIRATORY SYSTEMS: ICD-10-CM

## 2025-09-16 DIAGNOSIS — R09.A2 FOREIGN BODY SENSATION, THROAT: ICD-10-CM

## 2025-09-16 PROCEDURE — 31575 DIAGNOSTIC LARYNGOSCOPY: CPT

## 2025-09-16 PROCEDURE — 99204 OFFICE O/P NEW MOD 45 MIN: CPT | Mod: 25

## 2025-09-16 RX ORDER — NYSTATIN 100000 [USP'U]/ML
100000 SUSPENSION ORAL
Qty: 300 | Refills: 0 | Status: ACTIVE | COMMUNITY
Start: 2025-09-16 | End: 1900-01-01